# Patient Record
Sex: FEMALE | Race: WHITE | Employment: OTHER | URBAN - METROPOLITAN AREA
[De-identification: names, ages, dates, MRNs, and addresses within clinical notes are randomized per-mention and may not be internally consistent; named-entity substitution may affect disease eponyms.]

---

## 2019-12-14 ENCOUNTER — APPOINTMENT (OUTPATIENT)
Dept: GENERAL RADIOLOGY | Facility: HOSPITAL | Age: 74
DRG: 177 | End: 2019-12-14
Attending: EMERGENCY MEDICINE
Payer: MEDICARE

## 2019-12-14 ENCOUNTER — APPOINTMENT (OUTPATIENT)
Dept: ULTRASOUND IMAGING | Facility: HOSPITAL | Age: 74
DRG: 177 | End: 2019-12-14
Attending: EMERGENCY MEDICINE
Payer: MEDICARE

## 2019-12-14 ENCOUNTER — HOSPITAL ENCOUNTER (INPATIENT)
Facility: HOSPITAL | Age: 74
LOS: 37 days | Discharge: NURSING, REHAB OR LONG-TERM CARE | DRG: 177 | End: 2020-01-20
Attending: EMERGENCY MEDICINE | Admitting: HOSPITALIST
Payer: MEDICARE

## 2019-12-14 ENCOUNTER — APPOINTMENT (OUTPATIENT)
Dept: CT IMAGING | Facility: HOSPITAL | Age: 74
DRG: 177 | End: 2019-12-14
Attending: EMERGENCY MEDICINE
Payer: MEDICARE

## 2019-12-14 DIAGNOSIS — R91.8 LUNG MASS: Primary | ICD-10-CM

## 2019-12-14 DIAGNOSIS — R09.02 HYPOXIA: ICD-10-CM

## 2019-12-14 DIAGNOSIS — M79.89 SWELLING OF LOWER EXTREMITY: ICD-10-CM

## 2019-12-14 LAB
ANION GAP SERPL CALC-SCNC: 5 MMOL/L (ref 0–18)
BASOPHILS # BLD AUTO: 0.03 X10(3) UL (ref 0–0.2)
BASOPHILS NFR BLD AUTO: 0.2 %
BUN BLD-MCNC: 20 MG/DL (ref 7–18)
BUN/CREAT SERPL: 22.2 (ref 10–20)
CALCIUM BLD-MCNC: 8.7 MG/DL (ref 8.5–10.1)
CHLORIDE SERPL-SCNC: 108 MMOL/L (ref 98–112)
CO2 SERPL-SCNC: 31 MMOL/L (ref 21–32)
CREAT BLD-MCNC: 0.9 MG/DL (ref 0.55–1.02)
DEPRECATED RDW RBC AUTO: 52.1 FL (ref 35.1–46.3)
EOSINOPHIL # BLD AUTO: 0 X10(3) UL (ref 0–0.7)
EOSINOPHIL NFR BLD AUTO: 0 %
ERYTHROCYTE [DISTWIDTH] IN BLOOD BY AUTOMATED COUNT: 14.9 % (ref 11–15)
GLUCOSE BLD-MCNC: 128 MG/DL (ref 70–99)
HCT VFR BLD AUTO: 48.5 % (ref 35–48)
HGB BLD-MCNC: 15.7 G/DL (ref 12–16)
IMM GRANULOCYTES # BLD AUTO: 0.12 X10(3) UL (ref 0–1)
IMM GRANULOCYTES NFR BLD: 0.7 %
LYMPHOCYTES # BLD AUTO: 1.42 X10(3) UL (ref 1–4)
LYMPHOCYTES NFR BLD AUTO: 8.5 %
MCH RBC QN AUTO: 30.4 PG (ref 26–34)
MCHC RBC AUTO-ENTMCNC: 32.4 G/DL (ref 31–37)
MCV RBC AUTO: 93.8 FL (ref 80–100)
MONOCYTES # BLD AUTO: 0.8 X10(3) UL (ref 0.1–1)
MONOCYTES NFR BLD AUTO: 4.8 %
NEUTROPHILS # BLD AUTO: 14.39 X10 (3) UL (ref 1.5–7.7)
NEUTROPHILS # BLD AUTO: 14.39 X10(3) UL (ref 1.5–7.7)
NEUTROPHILS NFR BLD AUTO: 85.8 %
NT-PROBNP SERPL-MCNC: 973 PG/ML (ref ?–450)
OSMOLALITY SERPL CALC.SUM OF ELEC: 302 MOSM/KG (ref 275–295)
PLATELET # BLD AUTO: 274 10(3)UL (ref 150–450)
POTASSIUM SERPL-SCNC: 3.3 MMOL/L (ref 3.5–5.1)
RBC # BLD AUTO: 5.17 X10(6)UL (ref 3.8–5.3)
SODIUM SERPL-SCNC: 144 MMOL/L (ref 136–145)
WBC # BLD AUTO: 16.8 X10(3) UL (ref 4–11)

## 2019-12-14 PROCEDURE — 71045 X-RAY EXAM CHEST 1 VIEW: CPT | Performed by: EMERGENCY MEDICINE

## 2019-12-14 PROCEDURE — 71260 CT THORAX DX C+: CPT | Performed by: EMERGENCY MEDICINE

## 2019-12-14 PROCEDURE — 71046 X-RAY EXAM CHEST 2 VIEWS: CPT | Performed by: EMERGENCY MEDICINE

## 2019-12-14 PROCEDURE — 93970 EXTREMITY STUDY: CPT | Performed by: EMERGENCY MEDICINE

## 2019-12-14 RX ORDER — POTASSIUM CHLORIDE 20 MEQ/1
40 TABLET, EXTENDED RELEASE ORAL EVERY 4 HOURS
Status: DISPENSED | OUTPATIENT
Start: 2019-12-14 | End: 2019-12-15

## 2019-12-14 RX ORDER — NICOTINE 21 MG/24HR
1 PATCH, TRANSDERMAL 24 HOURS TRANSDERMAL DAILY
Status: DISCONTINUED | OUTPATIENT
Start: 2019-12-14 | End: 2020-01-20

## 2019-12-14 RX ORDER — HYDRALAZINE HYDROCHLORIDE 20 MG/ML
10 INJECTION INTRAMUSCULAR; INTRAVENOUS EVERY 6 HOURS PRN
Status: DISCONTINUED | OUTPATIENT
Start: 2019-12-14 | End: 2020-01-20

## 2019-12-14 RX ORDER — ALPRAZOLAM 0.25 MG/1
0.25 TABLET ORAL 2 TIMES DAILY PRN
Status: DISCONTINUED | OUTPATIENT
Start: 2019-12-14 | End: 2019-12-15

## 2019-12-14 RX ORDER — HYDROCODONE BITARTRATE AND ACETAMINOPHEN 5; 325 MG/1; MG/1
2 TABLET ORAL EVERY 4 HOURS PRN
Status: DISCONTINUED | OUTPATIENT
Start: 2019-12-14 | End: 2019-12-15

## 2019-12-14 RX ORDER — HEPARIN SODIUM 5000 [USP'U]/ML
5000 INJECTION, SOLUTION INTRAVENOUS; SUBCUTANEOUS EVERY 8 HOURS SCHEDULED
Status: COMPLETED | OUTPATIENT
Start: 2019-12-14 | End: 2019-12-15

## 2019-12-14 RX ORDER — ACETAMINOPHEN 325 MG/1
650 TABLET ORAL EVERY 4 HOURS PRN
Status: DISCONTINUED | OUTPATIENT
Start: 2019-12-14 | End: 2020-01-20

## 2019-12-14 RX ORDER — HYDROCODONE BITARTRATE AND ACETAMINOPHEN 5; 325 MG/1; MG/1
1 TABLET ORAL EVERY 4 HOURS PRN
Status: DISCONTINUED | OUTPATIENT
Start: 2019-12-14 | End: 2019-12-15

## 2019-12-14 RX ORDER — ONDANSETRON 2 MG/ML
4 INJECTION INTRAMUSCULAR; INTRAVENOUS EVERY 6 HOURS PRN
Status: DISCONTINUED | OUTPATIENT
Start: 2019-12-14 | End: 2020-01-20

## 2019-12-14 RX ORDER — METOCLOPRAMIDE HYDROCHLORIDE 5 MG/ML
10 INJECTION INTRAMUSCULAR; INTRAVENOUS EVERY 8 HOURS PRN
Status: DISCONTINUED | OUTPATIENT
Start: 2019-12-14 | End: 2019-12-22

## 2019-12-14 RX ORDER — SODIUM CHLORIDE 0.9 % (FLUSH) 0.9 %
3 SYRINGE (ML) INJECTION AS NEEDED
Status: DISCONTINUED | OUTPATIENT
Start: 2019-12-14 | End: 2020-01-20

## 2019-12-15 LAB
ANION GAP SERPL CALC-SCNC: 3 MMOL/L (ref 0–18)
BASOPHILS # BLD AUTO: 0.02 X10(3) UL (ref 0–0.2)
BASOPHILS NFR BLD AUTO: 0.1 %
BUN BLD-MCNC: 17 MG/DL (ref 7–18)
BUN/CREAT SERPL: 20.5 (ref 10–20)
CALCIUM BLD-MCNC: 8.5 MG/DL (ref 8.5–10.1)
CHLORIDE SERPL-SCNC: 108 MMOL/L (ref 98–112)
CO2 SERPL-SCNC: 32 MMOL/L (ref 21–32)
CREAT BLD-MCNC: 0.83 MG/DL (ref 0.55–1.02)
DEPRECATED RDW RBC AUTO: 54.1 FL (ref 35.1–46.3)
EOSINOPHIL # BLD AUTO: 0.02 X10(3) UL (ref 0–0.7)
EOSINOPHIL NFR BLD AUTO: 0.1 %
ERYTHROCYTE [DISTWIDTH] IN BLOOD BY AUTOMATED COUNT: 15 % (ref 11–15)
GLUCOSE BLD-MCNC: 117 MG/DL (ref 70–99)
HCT VFR BLD AUTO: 45.8 % (ref 35–48)
HGB BLD-MCNC: 14.4 G/DL (ref 12–16)
IMM GRANULOCYTES # BLD AUTO: 0.09 X10(3) UL (ref 0–1)
IMM GRANULOCYTES NFR BLD: 0.6 %
LYMPHOCYTES # BLD AUTO: 1.39 X10(3) UL (ref 1–4)
LYMPHOCYTES NFR BLD AUTO: 9.6 %
MCH RBC QN AUTO: 30.3 PG (ref 26–34)
MCHC RBC AUTO-ENTMCNC: 31.4 G/DL (ref 31–37)
MCV RBC AUTO: 96.2 FL (ref 80–100)
MONOCYTES # BLD AUTO: 0.83 X10(3) UL (ref 0.1–1)
MONOCYTES NFR BLD AUTO: 5.7 %
NEUTROPHILS # BLD AUTO: 12.1 X10 (3) UL (ref 1.5–7.7)
NEUTROPHILS # BLD AUTO: 12.1 X10(3) UL (ref 1.5–7.7)
NEUTROPHILS NFR BLD AUTO: 83.9 %
OSMOLALITY SERPL CALC.SUM OF ELEC: 299 MOSM/KG (ref 275–295)
PLATELET # BLD AUTO: 240 10(3)UL (ref 150–450)
POTASSIUM SERPL-SCNC: 3.3 MMOL/L (ref 3.5–5.1)
RBC # BLD AUTO: 4.76 X10(6)UL (ref 3.8–5.3)
SODIUM SERPL-SCNC: 143 MMOL/L (ref 136–145)
WBC # BLD AUTO: 14.5 X10(3) UL (ref 4–11)

## 2019-12-15 RX ORDER — IPRATROPIUM BROMIDE AND ALBUTEROL SULFATE 2.5; .5 MG/3ML; MG/3ML
3 SOLUTION RESPIRATORY (INHALATION)
Status: DISCONTINUED | OUTPATIENT
Start: 2019-12-15 | End: 2019-12-27

## 2019-12-15 RX ORDER — POTASSIUM CHLORIDE 20 MEQ/1
40 TABLET, EXTENDED RELEASE ORAL EVERY 4 HOURS
Status: COMPLETED | OUTPATIENT
Start: 2019-12-15 | End: 2019-12-15

## 2019-12-15 RX ORDER — IPRATROPIUM BROMIDE AND ALBUTEROL SULFATE 2.5; .5 MG/3ML; MG/3ML
SOLUTION RESPIRATORY (INHALATION)
Status: DISPENSED
Start: 2019-12-15 | End: 2019-12-16

## 2019-12-15 RX ORDER — METHYLPREDNISOLONE SODIUM SUCCINATE 125 MG/2ML
80 INJECTION, POWDER, LYOPHILIZED, FOR SOLUTION INTRAMUSCULAR; INTRAVENOUS EVERY 8 HOURS
Status: COMPLETED | OUTPATIENT
Start: 2019-12-15 | End: 2019-12-18

## 2019-12-15 NOTE — H&P
[facility] H[facility] PHYSICAL     CC: Patient presents with:  Swelling Edema  Dyspnea KELTON SOB       PCP: None Pcp    History of Present Illness:   Patient is a 66year old female with PMH sig- unknown (hasn't seen a doctor in years) nicotine use x y lesion  PSYCH- normal mentation, normal affect      LABS:   Lab Results   Component Value Date    WBC 14.5 12/15/2019    HGB 14.4 12/15/2019    HCT 45.8 12/15/2019    .0 12/15/2019    CREATSERUM 0.83 12/15/2019    BUN 17 12/15/2019     12/15/2 AIRWAYS: The mass detailed below abuts the anterior and right lateral aspect of the trachea and sonal crossing the midline. The mass further extends along the right main bronchus without definite luminal narrowing.  MEDIASTINUM/ALLEN: There is a lobulated Us Venous Doppler Leg Bilat - Diag Img (cpt=93970)    Result Date: 12/14/2019  PROCEDURE: US VENOUS DOPPLER LEG BILAT-DIAG IMG (CPT=93970)  COMPARISON: None. INDICATIONS: Swelling of legs and feet.   TECHNIQUE: Color duplex Doppler venous ultrasound NSR    ASSESSMENT / PLAN:   66year old female with PMH sig- unknown (hasn't seen a doctor in years) nicotine use x years here with SOB and leg swelling    Mediastinal mass with SVC compression  Elevated BP  Dyspnea  LE edema  Hypokalemia  Leukocytosis

## 2019-12-15 NOTE — ED NOTES
Orders for admission, patient is aware of plan and ready to go upstairs. Any questions, please call ED FRANCESCO Francisco December  at extension [phone]    Pt alert and oriented, ambulates without assist. 96% on 1 lpm via NC.  Pt aware of test results and agreeable to admission

## 2019-12-15 NOTE — ED PROVIDER NOTES
Patient Seen in: [facility] AND [facility] Emergency Department      History   Patient presents with:  Swelling Edema  Dyspnea KELTON SOB    Stated Complaint: swelling of legs and feet    HPI    Pt is 67 yo F who p/w b/l leg swelling x 4 days.  Started in left foot BUN/CREA Ratio 22.2 (*)     Calculated Osmolality 302 (*)     All other components within normal limits   PRO BETA NATRIURETIC PEPTIDE - Abnormal; Notable for the following components:    Pro-Beta Natriuretic Peptide 973 (*)     All other components wit this mass and could represent. 2. Ascending thoracic aorta is borderline aneurysmal measuring 4.9 cm.     Dictated by (CST): Desi Rizvi MD on 12/14/2019 at 20:54     Approved by (CST): Desi Rizvi MD on 12/14/2019 at 21:03          Us Venous three months to obtain basic health screening including reassessment of your blood pressure. Medications Prescribed:  There are no discharge medications for this patient.                  Present on Admission           ICD-10-CM Noted POA    Lung mass R9

## 2019-12-15 NOTE — PLAN OF CARE
Patient with dyspnea on exertion, remains on O2 2L. Patient reports being anxious because of diagnosis and because of not being able to smoke. Patient also reports that this has been a \"bad year\" for her family with many deaths.   Emotional support pro to facilitate oxygenation and minimize respiratory effort  - Oxygen supplementation based on oxygen saturation or ABGs  - Provide Smoking Cessation handout, if applicable  - Encourage broncho-pulmonary hygiene including cough, deep breathe, Incentive [facility] Debora

## 2019-12-15 NOTE — CONSULTS
[facility]  Report of Consultation    Jamil Simon Patient Status:  Inpatient    9/15/1945 MRN [id number]   Location [facility] 4W/SW/SE Attending Dora Chaparro MD   Hosp Day # 1 PCP None Pcp     REASON FOR CONSULTATION:     Mediastinal Intramuscular, Prior to discharge  •  nicotine (NICODERM CQ) 21 MG/24HR 1 patch, 1 patch, Transdermal, Daily  •  hydrALAzine HCl (APRESOLINE) injection 10 mg, 10 mg, Intravenous, Q6H PRN    Review of Systems:  A 10-point review of systems was done with per (H) 7 - 18 mg/dL    Creatinine 0.90 0.55 - 1.02 mg/dL    BUN/CREA Ratio 22.2 (H) 10.0 - 20.0    Calcium, Total 8.7 8.5 - 10.1 mg/dL    Calculated Osmolality 302 (H) 275 - 295 mOsm/kg    GFR, Non- 62 >=60    GFR, -American 72 >=60   C 4.0 - 11.0 x10(3) uL    RBC 4.76 3.80 - 5.30 x10(6)uL    HGB 14.4 12.0 - 16.0 g/dL    HCT 45.8 35.0 - 48.0 %    MCV 96.2 80.0 - 100.0 fL    MCH 30.3 26.0 - 34.0 pg    MCHC 31.4 31.0 - 37.0 g/dL    RDW-SD 54.1 (H) 35.1 - 46.3 fL    RDW 15.0 11.0 - 15.0 % (vvx=[id number])    Result Date: 12/14/2019  CONCLUSION:   No DVT in either lower extremity.    Dictated by (CST): Beryle Queen, MD on 12/14/2019 at 20:40     Approved by (CST): Beryle Queen, MD on 12/14/2019 at 20:40          Xr Chest Ap Portable  (cp

## 2019-12-15 NOTE — CONSULTS
Pulmonary Consult       NAME: Sujata Hart - ROOM: 240/283-J - MRN: [id number] - Age: 66year old - :  9/15/1945    Date of Admission: 2019  5:18 PM  Admission Diagnosis: Lung mass [R91.8]  Hypoxia [R09.02]  Swelling of lower extremity [M79.89] on file        Relationship status: Not on file      Intimate partner violence:        Fear of current or ex partner: Not on file        Emotionally abused: Not on file        Physically abused: Not on file        Forced sexual activity: Not on file    Oth Neck: Supple, symmetrical, trachea midline, no adenopathy,  no carotid bruit and no JVD. Back:   Non tender   Lungs:   Wheezing  bilaterally. Chest wall:  No tenderness or deformity.    Heart:  Regular rate and rhythm, S1, S2 normal, no murmur,   Abdo Absolute Prelim 14.39 (H) 1.50 - 7.70 x10 (3) uL    Neutrophil Absolute 14.39 (H) 1.50 - 7.70 x10(3) uL    Lymphocyte Absolute 1.42 1.00 - 4.00 x10(3) uL    Monocyte Absolute 0.80 0.10 - 1.00 x10(3) uL    Eosinophil Absolute 0.00 0.00 - 0.70 x10(3) uL    B uL    Neutrophil % 83.9 %    Lymphocyte % 9.6 %    Monocyte % 5.7 %    Eosinophil % 0.1 %    Basophil % 0.1 %    Immature Granulocyte % 0.6 %       Imaging: CT chest CONCLUSION:   1.  Large mass in the right suprahilar region with contralateral mediastinal

## 2019-12-15 NOTE — PLAN OF CARE
Problem: Patient Centered Care  Goal: Patient preferences are identified and integrated in the patient's plan of care  Description  Interventions:  - What would you like us to know as we care for you? I haven't been to a doctor in a long time.   I like to support as indicated  - Manage/alleviate anxiety  - Monitor for signs/symptoms of CO2 retention  Outcome: Progressing     Problem: COPING  Goal: Pt/Family able to verbalize concerns and demonstrate effective coping strategies  Description  INTERVENTIONS:

## 2019-12-16 ENCOUNTER — APPOINTMENT (OUTPATIENT)
Dept: CT IMAGING | Facility: HOSPITAL | Age: 74
DRG: 177 | End: 2019-12-16
Attending: INTERNAL MEDICINE
Payer: MEDICARE

## 2019-12-16 LAB
ALBUMIN SERPL-MCNC: 3.5 G/DL (ref 3.4–5)
ALP LIVER SERPL-CCNC: 65 U/L (ref 55–142)
ALT SERPL-CCNC: 36 U/L (ref 13–56)
ANION GAP SERPL CALC-SCNC: 3 MMOL/L (ref 0–18)
AST SERPL-CCNC: 20 U/L (ref 15–37)
BILIRUB DIRECT SERPL-MCNC: 0.2 MG/DL (ref 0–0.2)
BILIRUB SERPL-MCNC: 0.7 MG/DL (ref 0.1–2)
BUN BLD-MCNC: 19 MG/DL (ref 7–18)
BUN/CREAT SERPL: 21.1 (ref 10–20)
CALCIUM BLD-MCNC: 8.5 MG/DL (ref 8.5–10.1)
CHLORIDE SERPL-SCNC: 108 MMOL/L (ref 98–112)
CO2 SERPL-SCNC: 33 MMOL/L (ref 21–32)
CREAT BLD-MCNC: 0.9 MG/DL (ref 0.55–1.02)
DEPRECATED RDW RBC AUTO: 54.6 FL (ref 35.1–46.3)
ERYTHROCYTE [DISTWIDTH] IN BLOOD BY AUTOMATED COUNT: 15.4 % (ref 11–15)
GLUCOSE BLD-MCNC: 139 MG/DL (ref 70–99)
HCT VFR BLD AUTO: 44.5 % (ref 35–48)
HGB BLD-MCNC: 13.8 G/DL (ref 12–16)
INR BLD: 0.94 (ref 0.9–1.2)
LDH SERPL L TO P-CCNC: 307 U/L (ref 84–246)
M PROTEIN MFR SERPL ELPH: 5.8 G/DL (ref 6.4–8.2)
MCH RBC QN AUTO: 30 PG (ref 26–34)
MCHC RBC AUTO-ENTMCNC: 31 G/DL (ref 31–37)
MCV RBC AUTO: 96.7 FL (ref 80–100)
OSMOLALITY SERPL CALC.SUM OF ELEC: 303 MOSM/KG (ref 275–295)
PLATELET # BLD AUTO: 227 10(3)UL (ref 150–450)
POTASSIUM SERPL-SCNC: 4.8 MMOL/L (ref 3.5–5.1)
PROTHROMBIN TIME: 12.4 SECONDS (ref 11.8–14.5)
RBC # BLD AUTO: 4.6 X10(6)UL (ref 3.8–5.3)
SODIUM SERPL-SCNC: 144 MMOL/L (ref 136–145)
WBC # BLD AUTO: 12.2 X10(3) UL (ref 4–11)

## 2019-12-16 PROCEDURE — 74177 CT ABD & PELVIS W/CONTRAST: CPT | Performed by: INTERNAL MEDICINE

## 2019-12-16 RX ORDER — AMLODIPINE BESYLATE 2.5 MG/1
2.5 TABLET ORAL DAILY
Status: DISCONTINUED | OUTPATIENT
Start: 2019-12-16 | End: 2019-12-23

## 2019-12-16 NOTE — PLAN OF CARE
Patient's breathing slightly easier and swelling to lower extremities is less. NPO after midnight for possible CT guided biopsy versus EBUS.       Problem: Patient Centered Care  Goal: Patient preferences are identified and integrated in the patient's plan Spirometry  - Assess the need for suctioning and perform as needed  - Assess and instruct to report SOB or any respiratory difficulty  - Respiratory Therapy support as indicated  - Manage/alleviate anxiety  - Monitor for signs/symptoms of CO2 retention  Trish Sessions

## 2019-12-16 NOTE — PROGRESS NOTES
Subjective      No acute events overnight, feels okay, anxious to get diagnosis     PERTINENT HISTORY:     History:  History reviewed. No pertinent past medical history.   Past Surgical History:   Procedure Laterality Date   • APPENDECTOMY       Family Hi 73.9 kg (163 lb), SpO2 96 %. Performance Status: 2   General: Patient is alert and oriented x 3, not in acute distress.    Vital Signs: BP (!) 163/60 (BP Location: Left arm)   Pulse 89   Temp 97.6 °F (36.4 °C) (Oral)   Resp 18   Ht 1.575 m (5' 2\")   Wt 45.8 44.5   MCV 93.8 96.2 96.7   MCH 30.4 30.3 30.0   MCHC 32.4 31.4 31.0   RDW 14.9 15.0 15.4*   NEPRELIM 14.39* 12.10*  --    WBC 16.8* 14.5* 12.2*   .0 240.0 227.0             Imaging:    Ct Chest(contrast Only) (cpt=71260)    Result Date: 12/14/ highly concerning for a primary bronchogenic carcinoma and probably confluent yamilex tumor involvement. The mass invades the superior vena cava with resulting luminal   narrowing.   The mass also results in severe narrowing of the right upper lobar pulmonar

## 2019-12-16 NOTE — PROGRESS NOTES
Pulmonary Progress Note     Assessment / Plan:  1. Acute hypoxemic resp failure  - due to lung mass and possible AECOPD  - steroids as below  - wean O2 as tolerated   2. COPD exacerbation  - breo  - steroids  - BD protocol  3.  Lung mass   - likely primary

## 2019-12-16 NOTE — PROGRESS NOTES
[facility] Hospital[facility] Progress Note     PCP: None Pcp    Chief Complaint: follow-up    SUBJECTIVE:  Denies any new sx- ongoing SOB    OBJECTIVE:  Temp:  [97.6 °F (36.4 °C)-98.2 °F (36.8 °C)] 98.2 °F (36.8 °C)  Pulse:  [80-91] 80  Resp:  [16-20] 20  BP: (159-17 virus vaccine PF, hydrALAzine HCl      ASSESSMENT AND PLAN    66year old female with PMH sig- unknown (hasn't seen a doctor in years) nicotine use x years here with SOB and leg swelling     Mediastinal mass with SVC compression  Elevated BP  Dyspnea  LE e

## 2019-12-17 ENCOUNTER — ANESTHESIA (OUTPATIENT)
Dept: ENDOSCOPY | Facility: HOSPITAL | Age: 74
DRG: 177 | End: 2019-12-17
Payer: MEDICARE

## 2019-12-17 ENCOUNTER — APPOINTMENT (OUTPATIENT)
Dept: MRI IMAGING | Facility: HOSPITAL | Age: 74
DRG: 177 | End: 2019-12-17
Attending: INTERNAL MEDICINE
Payer: MEDICARE

## 2019-12-17 ENCOUNTER — ANESTHESIA EVENT (OUTPATIENT)
Dept: ENDOSCOPY | Facility: HOSPITAL | Age: 74
DRG: 177 | End: 2019-12-17
Payer: MEDICARE

## 2019-12-17 LAB
ANION GAP SERPL CALC-SCNC: 4 MMOL/L (ref 0–18)
BUN BLD-MCNC: 22 MG/DL (ref 7–18)
BUN/CREAT SERPL: 24.7 (ref 10–20)
CALCIUM BLD-MCNC: 8.4 MG/DL (ref 8.5–10.1)
CHLORIDE SERPL-SCNC: 107 MMOL/L (ref 98–112)
CO2 SERPL-SCNC: 32 MMOL/L (ref 21–32)
CREAT BLD-MCNC: 0.89 MG/DL (ref 0.55–1.02)
DEPRECATED RDW RBC AUTO: 55.5 FL (ref 35.1–46.3)
ERYTHROCYTE [DISTWIDTH] IN BLOOD BY AUTOMATED COUNT: 15.4 % (ref 11–15)
GLUCOSE BLD-MCNC: 141 MG/DL (ref 70–99)
HCT VFR BLD AUTO: 43.1 % (ref 35–48)
HGB BLD-MCNC: 13.4 G/DL (ref 12–16)
MCH RBC QN AUTO: 30.2 PG (ref 26–34)
MCHC RBC AUTO-ENTMCNC: 31.1 G/DL (ref 31–37)
MCV RBC AUTO: 97.1 FL (ref 80–100)
OSMOLALITY SERPL CALC.SUM OF ELEC: 302 MOSM/KG (ref 275–295)
PLATELET # BLD AUTO: 213 10(3)UL (ref 150–450)
POTASSIUM SERPL-SCNC: 4.3 MMOL/L (ref 3.5–5.1)
RBC # BLD AUTO: 4.44 X10(6)UL (ref 3.8–5.3)
SODIUM SERPL-SCNC: 143 MMOL/L (ref 136–145)
WBC # BLD AUTO: 17.4 X10(3) UL (ref 4–11)

## 2019-12-17 PROCEDURE — 70553 MRI BRAIN STEM W/O & W/DYE: CPT | Performed by: INTERNAL MEDICINE

## 2019-12-17 PROCEDURE — 07D78ZX EXTRACTION OF THORAX LYMPHATIC, VIA NATURAL OR ARTIFICIAL OPENING ENDOSCOPIC, DIAGNOSTIC: ICD-10-PCS | Performed by: INTERNAL MEDICINE

## 2019-12-17 PROCEDURE — 0BH17EZ INSERTION OF ENDOTRACHEAL AIRWAY INTO TRACHEA, VIA NATURAL OR ARTIFICIAL OPENING: ICD-10-PCS | Performed by: INTERNAL MEDICINE

## 2019-12-17 PROCEDURE — 0BJ08ZZ INSPECTION OF TRACHEOBRONCHIAL TREE, VIA NATURAL OR ARTIFICIAL OPENING ENDOSCOPIC: ICD-10-PCS | Performed by: INTERNAL MEDICINE

## 2019-12-17 RX ORDER — HYDROCODONE BITARTRATE AND ACETAMINOPHEN 5; 325 MG/1; MG/1
1 TABLET ORAL AS NEEDED
Status: DISCONTINUED | OUTPATIENT
Start: 2019-12-17 | End: 2019-12-17 | Stop reason: HOSPADM

## 2019-12-17 RX ORDER — HYDROCODONE BITARTRATE AND ACETAMINOPHEN 5; 325 MG/1; MG/1
2 TABLET ORAL AS NEEDED
Status: DISCONTINUED | OUTPATIENT
Start: 2019-12-17 | End: 2019-12-17 | Stop reason: HOSPADM

## 2019-12-17 RX ORDER — HYDROMORPHONE HYDROCHLORIDE 1 MG/ML
0.2 INJECTION, SOLUTION INTRAMUSCULAR; INTRAVENOUS; SUBCUTANEOUS EVERY 5 MIN PRN
Status: DISCONTINUED | OUTPATIENT
Start: 2019-12-17 | End: 2019-12-17 | Stop reason: HOSPADM

## 2019-12-17 RX ORDER — NALOXONE HYDROCHLORIDE 0.4 MG/ML
80 INJECTION, SOLUTION INTRAMUSCULAR; INTRAVENOUS; SUBCUTANEOUS AS NEEDED
Status: DISCONTINUED | OUTPATIENT
Start: 2019-12-17 | End: 2019-12-17 | Stop reason: HOSPADM

## 2019-12-17 RX ORDER — SODIUM CHLORIDE, SODIUM LACTATE, POTASSIUM CHLORIDE, CALCIUM CHLORIDE 600; 310; 30; 20 MG/100ML; MG/100ML; MG/100ML; MG/100ML
INJECTION, SOLUTION INTRAVENOUS CONTINUOUS
Status: DISCONTINUED | OUTPATIENT
Start: 2019-12-17 | End: 2019-12-17 | Stop reason: HOSPADM

## 2019-12-17 RX ORDER — PROCHLORPERAZINE EDISYLATE 5 MG/ML
5 INJECTION INTRAMUSCULAR; INTRAVENOUS ONCE AS NEEDED
Status: DISCONTINUED | OUTPATIENT
Start: 2019-12-17 | End: 2019-12-17 | Stop reason: HOSPADM

## 2019-12-17 RX ORDER — LIDOCAINE HYDROCHLORIDE 10 MG/ML
INJECTION, SOLUTION EPIDURAL; INFILTRATION; INTRACAUDAL; PERINEURAL AS NEEDED
Status: DISCONTINUED | OUTPATIENT
Start: 2019-12-17 | End: 2019-12-17 | Stop reason: SURG

## 2019-12-17 RX ORDER — HALOPERIDOL 5 MG/ML
0.25 INJECTION INTRAMUSCULAR ONCE AS NEEDED
Status: DISCONTINUED | OUTPATIENT
Start: 2019-12-17 | End: 2019-12-17 | Stop reason: HOSPADM

## 2019-12-17 RX ORDER — ALBUTEROL SULFATE 2.5 MG/3ML
2.5 SOLUTION RESPIRATORY (INHALATION)
Status: DISCONTINUED | OUTPATIENT
Start: 2019-12-17 | End: 2019-12-17 | Stop reason: HOSPADM

## 2019-12-17 RX ORDER — HYDROMORPHONE HYDROCHLORIDE 1 MG/ML
0.4 INJECTION, SOLUTION INTRAMUSCULAR; INTRAVENOUS; SUBCUTANEOUS EVERY 5 MIN PRN
Status: DISCONTINUED | OUTPATIENT
Start: 2019-12-17 | End: 2019-12-17 | Stop reason: HOSPADM

## 2019-12-17 RX ORDER — ONDANSETRON 2 MG/ML
4 INJECTION INTRAMUSCULAR; INTRAVENOUS ONCE AS NEEDED
Status: DISCONTINUED | OUTPATIENT
Start: 2019-12-17 | End: 2019-12-17 | Stop reason: HOSPADM

## 2019-12-17 RX ORDER — SODIUM CHLORIDE, SODIUM LACTATE, POTASSIUM CHLORIDE, CALCIUM CHLORIDE 600; 310; 30; 20 MG/100ML; MG/100ML; MG/100ML; MG/100ML
INJECTION, SOLUTION INTRAVENOUS CONTINUOUS PRN
Status: DISCONTINUED | OUTPATIENT
Start: 2019-12-17 | End: 2019-12-17 | Stop reason: SURG

## 2019-12-17 RX ORDER — MIDAZOLAM HYDROCHLORIDE 1 MG/ML
INJECTION INTRAMUSCULAR; INTRAVENOUS AS NEEDED
Status: DISCONTINUED | OUTPATIENT
Start: 2019-12-17 | End: 2019-12-17 | Stop reason: SURG

## 2019-12-17 RX ORDER — HYDROMORPHONE HYDROCHLORIDE 1 MG/ML
0.6 INJECTION, SOLUTION INTRAMUSCULAR; INTRAVENOUS; SUBCUTANEOUS EVERY 5 MIN PRN
Status: DISCONTINUED | OUTPATIENT
Start: 2019-12-17 | End: 2019-12-17 | Stop reason: HOSPADM

## 2019-12-17 RX ORDER — MORPHINE SULFATE 10 MG/ML
6 INJECTION, SOLUTION INTRAMUSCULAR; INTRAVENOUS EVERY 10 MIN PRN
Status: DISCONTINUED | OUTPATIENT
Start: 2019-12-17 | End: 2019-12-17 | Stop reason: HOSPADM

## 2019-12-17 RX ORDER — MORPHINE SULFATE 4 MG/ML
2 INJECTION, SOLUTION INTRAMUSCULAR; INTRAVENOUS EVERY 10 MIN PRN
Status: DISCONTINUED | OUTPATIENT
Start: 2019-12-17 | End: 2019-12-17 | Stop reason: HOSPADM

## 2019-12-17 RX ORDER — DEXAMETHASONE SODIUM PHOSPHATE 4 MG/ML
VIAL (ML) INJECTION AS NEEDED
Status: DISCONTINUED | OUTPATIENT
Start: 2019-12-17 | End: 2019-12-17 | Stop reason: SURG

## 2019-12-17 RX ORDER — IPRATROPIUM BROMIDE AND ALBUTEROL SULFATE 2.5; .5 MG/3ML; MG/3ML
3 SOLUTION RESPIRATORY (INHALATION) EVERY 6 HOURS PRN
Status: DISCONTINUED | OUTPATIENT
Start: 2019-12-17 | End: 2019-12-17 | Stop reason: HOSPADM

## 2019-12-17 RX ORDER — MORPHINE SULFATE 4 MG/ML
4 INJECTION, SOLUTION INTRAMUSCULAR; INTRAVENOUS EVERY 10 MIN PRN
Status: DISCONTINUED | OUTPATIENT
Start: 2019-12-17 | End: 2019-12-17 | Stop reason: HOSPADM

## 2019-12-17 RX ORDER — ONDANSETRON 2 MG/ML
INJECTION INTRAMUSCULAR; INTRAVENOUS AS NEEDED
Status: DISCONTINUED | OUTPATIENT
Start: 2019-12-17 | End: 2019-12-17 | Stop reason: SURG

## 2019-12-17 RX ADMIN — MIDAZOLAM HYDROCHLORIDE 2 MG: 1 INJECTION INTRAMUSCULAR; INTRAVENOUS at 15:03:00

## 2019-12-17 RX ADMIN — ONDANSETRON 4 MG: 2 INJECTION INTRAMUSCULAR; INTRAVENOUS at 15:39:00

## 2019-12-17 RX ADMIN — LIDOCAINE HYDROCHLORIDE 50 MG: 10 INJECTION, SOLUTION EPIDURAL; INFILTRATION; INTRACAUDAL; PERINEURAL at 15:03:00

## 2019-12-17 RX ADMIN — SODIUM CHLORIDE, SODIUM LACTATE, POTASSIUM CHLORIDE, CALCIUM CHLORIDE: 600; 310; 30; 20 INJECTION, SOLUTION INTRAVENOUS at 15:31:00

## 2019-12-17 RX ADMIN — SODIUM CHLORIDE, SODIUM LACTATE, POTASSIUM CHLORIDE, CALCIUM CHLORIDE: 600; 310; 30; 20 INJECTION, SOLUTION INTRAVENOUS at 15:30:00

## 2019-12-17 RX ADMIN — SODIUM CHLORIDE, SODIUM LACTATE, POTASSIUM CHLORIDE, CALCIUM CHLORIDE: 600; 310; 30; 20 INJECTION, SOLUTION INTRAVENOUS at 15:00:00

## 2019-12-17 RX ADMIN — SODIUM CHLORIDE, SODIUM LACTATE, POTASSIUM CHLORIDE, CALCIUM CHLORIDE: 600; 310; 30; 20 INJECTION, SOLUTION INTRAVENOUS at 15:56:00

## 2019-12-17 RX ADMIN — DEXAMETHASONE SODIUM PHOSPHATE 4 MG: 4 MG/ML VIAL (ML) INJECTION at 15:33:00

## 2019-12-17 NOTE — ANESTHESIA POSTPROCEDURE EVALUATION
Patient: Jamil Simon    Procedure Summary     Date:  12/17/19 Room / Location:  [facility] ENDOSCOPY 05 / [facility] ENDOSCOPY    Anesthesia Start:  1500 Anesthesia Stop:  9128    Procedures:       BRONCHOSCOPY (N/A )      ENDOBRONCHIAL ULTRASOUND (EBUS) (N/A )    B

## 2019-12-17 NOTE — PLAN OF CARE
Problem: Patient Centered Care  Goal: Patient preferences are identified and integrated in the patient's plan of care  Description  Interventions:  - What would you like us to know as we care for you? I haven't been to a doctor in a long time.   I like to support as indicated  - Manage/alleviate anxiety  - Monitor for signs/symptoms of CO2 retention  Outcome: Progressing     Problem: COPING  Goal: Pt/Family able to verbalize concerns and demonstrate effective coping strategies  Description  INTERVENTIONS: call light within reach, will continue to monitor.

## 2019-12-17 NOTE — ANESTHESIA PREPROCEDURE EVALUATION
Anesthesia PreOp Note    HPI:     Danica Cramer is a 66year old female who presents for preoperative consultation requested by: Yoandy Duque MD    Date of Surgery: 12/14/2019 - 12/17/2019    Procedure(s):  BRONCHOSCOPY  ENDOBRONCHIAL ULTRASOUND ( influenza vaccine (PF)(FLUZONE HD) high dose for 65 yrs & older inj 0.5ml, 0.5 mL, Intramuscular, Prior to discharge, Lus Bloch, DO  nicotine (NICODERM CQ) 21 MG/24HR 1 patch, 1 patch, Transdermal, Daily, Anamaria Lopez DO, 1 patch at 12/17/19 08 Forced sexual activity: Not on file    Other Topics      Concerns:        Not on file    Social History Narrative      Not on file      Available pre-op labs reviewed.   Lab Results   Component Value Date    WBC 17.4 (H) 12/17/2019    RBC 4.44 12/17/2 Endo/Other - negative ROS   Abdominal                Anesthesia Plan:   ASA:  3  Plan:   General  Airway:  ETT and Video laryngoscope  Post-op Pain Management: IV analgesics  Plan Comments: Was able to lie flat with nasal cannula, SpO2 remained at 95% on

## 2019-12-17 NOTE — PROGRESS NOTES
Subjective      S/p ct abd/pelvis and MRi of brain yesterday   For bronch today     PERTINENT HISTORY:     History:  History reviewed. No pertinent past medical history.   Past Surgical History:   Procedure Laterality Date   • APPENDECTOMY       Family Hi (163 lb), SpO2 96 %. Performance Status: 2   General: Patient is alert and oriented x 3, not in acute distress.    Vital Signs: /63 (BP Location: Right arm)   Pulse 77   Temp 98 °F (36.7 °C) (Oral)   Resp 20   Ht 1.575 m (5' 2\")   Wt 73.9 kg (163 NEPRELIM 14.39* 12.10*  --   --    WBC 16.8* 14.5* 12.2* 17.4*   .0 240.0 227.0 213.0             Imaging:    Ct Chest(contrast Only) (cpt=71260)    Result Date: 12/14/2019  CONCLUSION:  1.  Large mass in the right suprahilar region with contralate Uncomplicated colonic diverticulosis. 5. Status post appendectomy. 6. Lesser incidental findings as above.     Dictated by (CST): Jessie Tovar MD on 12/16/2019 at 15:43     Approved by (CST): Jessie Tovar MD on 12/16/2019 at 15:51          Xr Chest pathology results    COPD exacerbation   - started on steroids by pulm for COPD, will also help with SVC syndrome     Plan  - MRI pending   - await bronchoscopy results     [facility] Hematology/Oncology

## 2019-12-17 NOTE — ANESTHESIA PROCEDURE NOTES
Airway  Date/Time: 12/17/2019 3:06 PM  Urgency: elective    Airway not difficult    General Information and Staff    Patient location during procedure: endo  Anesthesiologist: Cristiana Sandoval MD  Performed: anesthesiologist     Indications and Patient Co

## 2019-12-17 NOTE — PLAN OF CARE
Pt went for CT abd/pelvis. MRI ordered but not complete today. Tolerating general diet. Plan for NPO at midnight for bronch tomorrow. Pt aware and agreeable. C/o headache, managed with tylenol. Ambulating standby assist. Continued on 1.5L oxygen.  Family at Cessation handout, if applicable  - Encourage broncho-pulmonary hygiene including cough, deep breathe, Incentive Spirometry  - Assess the need for suctioning and perform as needed  - Assess and instruct to report SOB or any respiratory difficulty  - Respir Monitor electrolytes and administer replacement therapy as ordered  Outcome: Progressing

## 2019-12-17 NOTE — PROGRESS NOTES
[facility] Hospitalist Progress Note     PCP: None Pcp    Chief Complaint: follow-up    SUBJECTIVE:  Denies any new sx- ongoing SOB    OBJECTIVE:  Temp:  [98 °F (36.7 °C)-98.2 °F (36.8 °C)] 98 °F (36.7 °C)  Pulse:  [] 77  Resp:  [20] 20  BP: (147-163)/(60 [DISCONTINUED] HYDROcodone-acetaminophen, ondansetron HCl, Metoclopramide HCl, influenza virus vaccine PF, hydrALAzine HCl      ASSESSMENT AND PLAN    Wale Gaitan 66year old female with PMH sig- unknown (hasn't seen a doctor in years) nicotine use x y

## 2019-12-17 NOTE — OPERATIVE REPORT
Bronchoscopy procedure report    Preop diagnosis: Abnormal CT Chest  Postop diagnosis: Abnormal CT Chest  Procedure performed: Bronchoscopy with endobronchial ultrasound guided transbronchial needle aspiration    Procedure:  Patient was identified in the p the recovery area. Estimated Blood Loss: <5ml    Rapid on-site evaluation: adequate sampling, preliminary consistent with small cell lung cancer    Nette Cedillo M.D.   Pulmonary and Critical Care Medicine  [facility]

## 2019-12-18 ENCOUNTER — APPOINTMENT (OUTPATIENT)
Dept: CV DIAGNOSTICS | Facility: HOSPITAL | Age: 74
DRG: 177 | End: 2019-12-18
Attending: INTERNAL MEDICINE
Payer: MEDICARE

## 2019-12-18 ENCOUNTER — APPOINTMENT (OUTPATIENT)
Dept: PICC SERVICES | Facility: HOSPITAL | Age: 74
DRG: 177 | End: 2019-12-18
Attending: INTERNAL MEDICINE
Payer: MEDICARE

## 2019-12-18 PROBLEM — C34.90 SMALL CELL LUNG CANCER (HCC): Status: ACTIVE | Noted: 2019-12-18

## 2019-12-18 LAB
ANION GAP SERPL CALC-SCNC: 4 MMOL/L (ref 0–18)
BUN BLD-MCNC: 32 MG/DL (ref 7–18)
BUN/CREAT SERPL: 38.1 (ref 10–20)
CALCIUM BLD-MCNC: 8.3 MG/DL (ref 8.5–10.1)
CHLORIDE SERPL-SCNC: 106 MMOL/L (ref 98–112)
CO2 SERPL-SCNC: 34 MMOL/L (ref 21–32)
CREAT BLD-MCNC: 0.84 MG/DL (ref 0.55–1.02)
GLUCOSE BLD-MCNC: 133 MG/DL (ref 70–99)
OSMOLALITY SERPL CALC.SUM OF ELEC: 307 MOSM/KG (ref 275–295)
POTASSIUM SERPL-SCNC: 4.6 MMOL/L (ref 3.5–5.1)
SODIUM SERPL-SCNC: 144 MMOL/L (ref 136–145)

## 2019-12-18 PROCEDURE — 93306 TTE W/DOPPLER COMPLETE: CPT | Performed by: INTERNAL MEDICINE

## 2019-12-18 PROCEDURE — 05HY33Z INSERTION OF INFUSION DEVICE INTO UPPER VEIN, PERCUTANEOUS APPROACH: ICD-10-PCS | Performed by: INTERNAL MEDICINE

## 2019-12-18 RX ORDER — ENOXAPARIN SODIUM 100 MG/ML
40 INJECTION SUBCUTANEOUS DAILY
Status: DISCONTINUED | OUTPATIENT
Start: 2019-12-18 | End: 2019-12-24

## 2019-12-18 RX ORDER — PREDNISONE 20 MG/1
40 TABLET ORAL
Status: DISCONTINUED | OUTPATIENT
Start: 2019-12-19 | End: 2019-12-20

## 2019-12-18 RX ORDER — LIDOCAINE HYDROCHLORIDE 10 MG/ML
0.5 INJECTION, SOLUTION INFILTRATION; PERINEURAL ONCE AS NEEDED
Status: ACTIVE | OUTPATIENT
Start: 2019-12-18 | End: 2019-12-18

## 2019-12-18 RX ORDER — FUROSEMIDE 10 MG/ML
20 INJECTION INTRAMUSCULAR; INTRAVENOUS ONCE
Status: COMPLETED | OUTPATIENT
Start: 2019-12-18 | End: 2019-12-18

## 2019-12-18 RX ORDER — ALLOPURINOL 300 MG/1
300 TABLET ORAL 2 TIMES DAILY
Status: DISCONTINUED | OUTPATIENT
Start: 2019-12-18 | End: 2020-01-20

## 2019-12-18 RX ORDER — SODIUM CHLORIDE 0.9 % (FLUSH) 0.9 %
10 SYRINGE (ML) INJECTION AS NEEDED
Status: DISCONTINUED | OUTPATIENT
Start: 2019-12-18 | End: 2020-01-14

## 2019-12-18 NOTE — PROGRESS NOTES
[facility] Hospitalist Progress Note     PCP: None Pcp    Chief Complaint: follow-up    SUBJECTIVE:  - feels about the same, still having wheezing, still has LE edema, but improved from admission, has 3 years of stable orthopnea, has knowles which is stable    OBJE Besylate  2.5 mg Oral Daily   • Fluticasone Furoate-Vilanterol  1 puff Inhalation Daily   • MethylPREDNISolone Sodium Succ  80 mg Intravenous Q8H   • ipratropium-albuterol  3 mL Nebulization Q6H WA   • nicotine  1 patch Transdermal Daily       Normal Salin

## 2019-12-18 NOTE — PROGRESS NOTES
Vascular Access Note  Powerglide midline catheter insertion    Vascular Access Screening:   Allergies to Lidocaine: no  Allergies to Latex: no  Presence of Pacemaker/Defibrillator: No  Mastectomy with Lymph Node Dissection: No  AV Fistula / AV Graft: No  Sarah Bolnarendra

## 2019-12-18 NOTE — PLAN OF CARE
Problem: Patient Centered Care  Goal: Patient preferences are identified and integrated in the patient's plan of care  Description  Interventions:  - What would you like us to know as we care for you? I haven't been to a doctor in a long time.   I like to support as indicated  - Manage/alleviate anxiety  - Monitor for signs/symptoms of CO2 retention  Outcome: Progressing     Problem: COPING  Goal: Pt/Family able to verbalize concerns and demonstrate effective coping strategies  Description  INTERVENTIONS: pitting edema noted. Legs elevated on pillows. Safety measures in place, call light within reach, will continue to monitor.

## 2019-12-18 NOTE — PROGRESS NOTES
Pulmonary Progress Note     Assessment / Plan:  1. Acute hypoxemic resp failure  - due to lung mass and possible AECOPD  - steroids as below  - wean O2 as tolerated   2. COPD exacerbation  - IV to po steroids tomorrow  - breo  - BD protocol  3.  Lung mass

## 2019-12-18 NOTE — PROGRESS NOTES
Subjective      S/p bronch yesterday prelim read is small cell, await final pathology   she feels well. Slight cough     PERTINENT HISTORY:     History:  History reviewed. No pertinent past medical history.   Past Surgical History:   Procedure Laterality inj 0.5ml, 0.5 mL, Intramuscular, Prior to discharge  •  nicotine (NICODERM CQ) 21 MG/24HR 1 patch, 1 patch, Transdermal, Daily  •  hydrALAzine HCl (APRESOLINE) injection 10 mg, 10 mg, Intravenous, Q6H PRN    Review of Systems:  A 10-point review of system 96.7 97.1   MCH 30.4 30.3 30.0 30.2   MCHC 32.4 31.4 31.0 31.1   RDW 14.9 15.0 15.4* 15.4*   NEPRELIM 14.39* 12.10*  --   --    WBC 16.8* 14.5* 12.2* 17.4*   .0 240.0 227.0 213.0             Imaging:    Ct Chest(contrast Only) (cpt=71260)    Result abdominal sonography for further assessment. 3. Infrarenal abdominal aortic ectasia. 4. Uncomplicated colonic diverticulosis. 5. Status post appendectomy. 6. Lesser incidental findings as above.     Dictated by (CST): Jennifer Mahmood MD on 12/16/2019 a - s/p bronchoscopy, await final read  - if consistent with small cell, will give chemotherapy as an inpatient     SVC syndrome  - mildly symptomatic   - await pathology results    COPD exacerbation   - started on steroids by pulm for COPD, will also help

## 2019-12-18 NOTE — PLAN OF CARE
Pt alert and oriented x4. VSS. On 1.5L of O2. On remote tele. Voiding appropriately in toilet. Up with standby assist. No complaints of nausea or pain. IV solumedrol given. Had brain MRI and bronchoscopy today. Plan for 2Decho tomorrow.  Fall precautions in saturation or ABGs  - Provide Smoking Cessation handout, if applicable  - Encourage broncho-pulmonary hygiene including cough, deep breathe, Incentive Spirometry  - Assess the need for suctioning and perform as needed  - Assess and instruct to report SOB o for life threatening arrhythmias  - Monitor electrolytes and administer replacement therapy as ordered  Outcome: Progressing

## 2019-12-19 LAB
ALBUMIN SERPL-MCNC: 2.9 G/DL (ref 3.4–5)
ALBUMIN/GLOB SERPL: 1.3 {RATIO} (ref 1–2)
ALP LIVER SERPL-CCNC: 60 U/L (ref 55–142)
ALT SERPL-CCNC: 35 U/L (ref 13–56)
ANION GAP SERPL CALC-SCNC: 3 MMOL/L (ref 0–18)
AST SERPL-CCNC: 19 U/L (ref 15–37)
BASOPHILS # BLD AUTO: 0.02 X10(3) UL (ref 0–0.2)
BASOPHILS NFR BLD AUTO: 0.1 %
BILIRUB SERPL-MCNC: 0.5 MG/DL (ref 0.1–2)
BUN BLD-MCNC: 25 MG/DL (ref 7–18)
BUN/CREAT SERPL: 27.8 (ref 10–20)
CALCIUM BLD-MCNC: 8.4 MG/DL (ref 8.5–10.1)
CHLORIDE SERPL-SCNC: 105 MMOL/L (ref 98–112)
CO2 SERPL-SCNC: 35 MMOL/L (ref 21–32)
CREAT BLD-MCNC: 0.9 MG/DL (ref 0.55–1.02)
DEPRECATED RDW RBC AUTO: 56.6 FL (ref 35.1–46.3)
EOSINOPHIL # BLD AUTO: 0.01 X10(3) UL (ref 0–0.7)
EOSINOPHIL NFR BLD AUTO: 0.1 %
ERYTHROCYTE [DISTWIDTH] IN BLOOD BY AUTOMATED COUNT: 15.4 % (ref 11–15)
GLOBULIN PLAS-MCNC: 2.3 G/DL (ref 2.8–4.4)
GLUCOSE BLD-MCNC: 100 MG/DL (ref 70–99)
HAV IGM SER QL: 2.7 MG/DL (ref 1.6–2.6)
HCT VFR BLD AUTO: 43.4 % (ref 35–48)
HGB BLD-MCNC: 13.6 G/DL (ref 12–16)
IMM GRANULOCYTES # BLD AUTO: 0.2 X10(3) UL (ref 0–1)
IMM GRANULOCYTES NFR BLD: 1.3 %
LYMPHOCYTES # BLD AUTO: 1.09 X10(3) UL (ref 1–4)
LYMPHOCYTES NFR BLD AUTO: 6.8 %
M PROTEIN MFR SERPL ELPH: 5.2 G/DL (ref 6.4–8.2)
MCH RBC QN AUTO: 31.2 PG (ref 26–34)
MCHC RBC AUTO-ENTMCNC: 31.3 G/DL (ref 31–37)
MCV RBC AUTO: 99.5 FL (ref 80–100)
MONOCYTES # BLD AUTO: 1.02 X10(3) UL (ref 0.1–1)
MONOCYTES NFR BLD AUTO: 6.4 %
NEUTROPHILS # BLD AUTO: 13.6 X10 (3) UL (ref 1.5–7.7)
NEUTROPHILS # BLD AUTO: 13.6 X10(3) UL (ref 1.5–7.7)
NEUTROPHILS NFR BLD AUTO: 85.3 %
OSMOLALITY SERPL CALC.SUM OF ELEC: 300 MOSM/KG (ref 275–295)
PLATELET # BLD AUTO: 147 10(3)UL (ref 150–450)
POTASSIUM SERPL-SCNC: 4.2 MMOL/L (ref 3.5–5.1)
RBC # BLD AUTO: 4.36 X10(6)UL (ref 3.8–5.3)
SODIUM SERPL-SCNC: 143 MMOL/L (ref 136–145)
WBC # BLD AUTO: 15.9 X10(3) UL (ref 4–11)

## 2019-12-19 PROCEDURE — 3E04305 INTRODUCTION OF OTHER ANTINEOPLASTIC INTO CENTRAL VEIN, PERCUTANEOUS APPROACH: ICD-10-PCS | Performed by: INTERNAL MEDICINE

## 2019-12-19 RX ORDER — DEXAMETHASONE 4 MG/1
4 TABLET ORAL EVERY 12 HOURS SCHEDULED
Status: COMPLETED | OUTPATIENT
Start: 2019-12-20 | End: 2019-12-21

## 2019-12-19 RX ORDER — FUROSEMIDE 10 MG/ML
20 INJECTION INTRAMUSCULAR; INTRAVENOUS ONCE
Status: DISCONTINUED | OUTPATIENT
Start: 2019-12-19 | End: 2019-12-19

## 2019-12-19 RX ORDER — FUROSEMIDE 10 MG/ML
40 INJECTION INTRAMUSCULAR; INTRAVENOUS ONCE
Status: DISCONTINUED | OUTPATIENT
Start: 2019-12-19 | End: 2019-12-19

## 2019-12-19 NOTE — PLAN OF CARE
A&Ox4. Up x1 SB. Productive cough per pt report. Regular diet, tolerating well. On 1 L O2 via NC. Voiding freely. BM today. Daily weight. Lovenox and SCDs. BLE swelling.  Reviewed with Dr. Vinny Morillo, since patient is reviewing mannitol post cisplatin, will h Care Plan goals for specific interventions   Outcome: Progressing     Problem: RESPIRATORY - ADULT  Goal: Achieves optimal ventilation and oxygenation  Description  INTERVENTIONS:  - Assess for changes in respiratory status  - Assess for changes in mentati ex. Angina  - Evaluate fluid balance, assess for edema, trend weights  Outcome: Progressing  Goal: Absence of cardiac arrhythmias or at baseline  Description  INTERVENTIONS:  - Continuous cardiac monitoring, monitor vital signs, obtain 12 lead EKG if indic

## 2019-12-19 NOTE — PROGRESS NOTES
[facility] Hospitalist Progress Note     PCP: None Pcp    Chief Complaint: follow-up    SUBJECTIVE:  - started on chemo  - is very fatigued  - still wheezing and coughing, +le edema, had good uop w/ lasix, but weight is up    OBJECTIVE:  Temp:  [98.2 °F (36.8 ° infusion in saline  12.5 g Intravenous Once   • CISplatin (PLATINOL) chemo infusion  75 mg/m2 Intravenous Once   • etoposide (VEPESID, -16) chemo infusion  100 mg/m2 Intravenous Once per day on Thu Fri Sat   • potassium chloride and magnesium sulfate IV will start low dose amlodipine and uptitrate as needed  - PRN hydralazine ok     Hypokalemia  - repletion protocol     Nicotine use disorder  - agreeable to patch    Dispo - pending improvement      Janeen Villatoro DO  [facility] Hospitalist  Pager: [phone]

## 2019-12-19 NOTE — CONSULTS
[facility]    Report of Consultation    Maura Maynard Patient Status:  Inpatient    9/15/1945 MRN [id number]   Location [facility] 4W/SW/SE Attending Jaya Burgess, [address] [facility] Day # 4 PCP None Pcp     Date of A puff, Inhalation, Daily  •  ipratropium-albuterol (DUONEB) nebulizer solution 3 mL, 3 mL, Nebulization, Q6H WA  •  Normal Saline Flush 0.9 % injection 3 mL, 3 mL, Intravenous, PRN  •  acetaminophen (TYLENOL) tab 650 mg, 650 mg, Oral, Q4H PRN **OR** [DISCON 12/17/2019    .0 12/17/2019    CREATSERUM 0.84 12/18/2019    BUN 32 (H) 12/18/2019     12/18/2019    K 4.6 12/18/2019     12/18/2019    CO2 34.0 (H) 12/18/2019     (H) 12/18/2019    CA 8.3 (L) 12/18/2019    ALB 3.5 12/16/2019    A

## 2019-12-19 NOTE — PROGRESS NOTES
Pulmonary Progress Note     Assessment / Plan:  1. Acute hypoxemic resp failure  - due to lung mass and possible AECOPD  - steroids as below  - wean O2 as tolerated   2. COPD exacerbation  - IV to po steroids today  - breo  - BD protocol  3.  SCLC  - based

## 2019-12-19 NOTE — PLAN OF CARE
Date:12/19/2019  Drug name and dosage administered  Cisplatin 131mg and Etoposide 180mg     Drug Sequence:  Cisplatin then Etoposide     Location of intravenous site (e.g. Central line, peripheral line): L Midline-ok per Dr. Jen Mckeon to use for chemo.

## 2019-12-19 NOTE — PROGRESS NOTES
Subjective      S/p mid line yesterday to initiate chemotherapy today. She is slightly nervous but feels okay    PERTINENT HISTORY:     History:  History reviewed. No pertinent past medical history.   Past Surgical History:   Procedure Laterality Date WA  •  Normal Saline Flush 0.9 % injection 3 mL, 3 mL, Intravenous, PRN  •  acetaminophen (TYLENOL) tab 650 mg, 650 mg, Oral, Q4H PRN **OR** [DISCONTINUED] HYDROcodone-acetaminophen (NORCO) 5-325 MG per tab 1 tablet, 1 tablet, Oral, Q4H PRN **OR** [DISCONT Potassium 4.2 3.5 - 5.1 mmol/L    Chloride 105 98 - 112 mmol/L    CO2 35.0 (H) 21.0 - 32.0 mmol/L    Anion Gap 3 0 - 18 mmol/L    BUN 25 (H) 7 - 18 mg/dL    Creatinine 0.90 0.55 - 1.02 mg/dL    BUN/CREA Ratio 27.8 (H) 10.0 - 20.0    Calcium, Total 8.4 (L) 13.8 13.4 13.6   HCT 48.5* 45.8 44.5 43.1 43.4   MCV 93.8 96.2 96.7 97.1 99.5   MCH 30.4 30.3 30.0 30.2 31.2   MCHC 32.4 31.4 31.0 31.1 31.3   RDW 14.9 15.0 15.4* 15.4* 15.4*   NEPRELIM 14.39* 12.10*  --   --  13.60*   WBC 16.8* 14.5* 12.2* 17.4* 15.9*   P gallbladder fundus, in association with a Phrygian cap. Consider follow-up right upper quadrant abdominal sonography for further assessment. 3. Infrarenal abdominal aortic ectasia. 4. Uncomplicated colonic diverticulosis. 5. Status post appendectomy. bronchoscopy, cytology consistent with small cell carcinoma  - s/p midline, unable to place picc or port due to svc syndrome.  Chemotherapy orders sent to pharmacy  - reviewed regimen with patient, plan for inpatient cis/etoposide , cycle 1 while inpatient

## 2019-12-19 NOTE — PLAN OF CARE
Pt alert and oriented x4. VSS. On 1L of oxygen. Lovenox for DVT prophylaxis. Tolerating general diet, no nausea. Tylenol given for headache 1x, no complaints of pain since. IV solumedrol given, plan to switch to oral prednisone tomorrow. Lasix IV given 1x. for changes in mentation and behavior  - Position to facilitate oxygenation and minimize respiratory effort  - Oxygen supplementation based on oxygen saturation or ABGs  - Provide Smoking Cessation handout, if applicable  - Encourage broncho-pulmonary hygi obtain 12 lead EKG if indicated  - Evaluate effectiveness of antiarrhythmic and heart rate control medications as ordered  - Initiate emergency measures for life threatening arrhythmias  - Monitor electrolytes and administer replacement therapy as ordered

## 2019-12-19 NOTE — PLAN OF CARE
VSS, afebrile. On 2LO2. Up with SBA. Productive cough. No c/o pain. Voiding well. Saline locked. Midline flushed. Nicotine patch in place. Safety plan in place, calling appropriately for assistance. Plan for 1st chemo today.      Problem: Patient Centered C retention  Outcome: Progressing     Problem: COPING  Goal: Pt/Family able to verbalize concerns and demonstrate effective coping strategies  Description  INTERVENTIONS:  - Assist patient/family to identify coping skills, available support systems and cultu

## 2019-12-20 LAB
ANION GAP SERPL CALC-SCNC: 2 MMOL/L (ref 0–18)
BASOPHILS # BLD AUTO: 0.02 X10(3) UL (ref 0–0.2)
BASOPHILS NFR BLD AUTO: 0.1 %
BUN BLD-MCNC: 22 MG/DL (ref 7–18)
BUN/CREAT SERPL: 28.9 (ref 10–20)
CALCIUM BLD-MCNC: 8 MG/DL (ref 8.5–10.1)
CHLORIDE SERPL-SCNC: 106 MMOL/L (ref 98–112)
CO2 SERPL-SCNC: 33 MMOL/L (ref 21–32)
CREAT BLD-MCNC: 0.76 MG/DL (ref 0.55–1.02)
DEPRECATED RDW RBC AUTO: 56.9 FL (ref 35.1–46.3)
EOSINOPHIL # BLD AUTO: 0 X10(3) UL (ref 0–0.7)
EOSINOPHIL NFR BLD AUTO: 0 %
ERYTHROCYTE [DISTWIDTH] IN BLOOD BY AUTOMATED COUNT: 15.4 % (ref 11–15)
GLUCOSE BLD-MCNC: 122 MG/DL (ref 70–99)
HAV IGM SER QL: 2.9 MG/DL (ref 1.6–2.6)
HCT VFR BLD AUTO: 41.9 % (ref 35–48)
HGB BLD-MCNC: 12.9 G/DL (ref 12–16)
IMM GRANULOCYTES # BLD AUTO: 0.16 X10(3) UL (ref 0–1)
IMM GRANULOCYTES NFR BLD: 1.2 %
LYMPHOCYTES # BLD AUTO: 0.48 X10(3) UL (ref 1–4)
LYMPHOCYTES NFR BLD AUTO: 3.5 %
MCH RBC QN AUTO: 30.9 PG (ref 26–34)
MCHC RBC AUTO-ENTMCNC: 30.8 G/DL (ref 31–37)
MCV RBC AUTO: 100.2 FL (ref 80–100)
MONOCYTES # BLD AUTO: 0.62 X10(3) UL (ref 0.1–1)
MONOCYTES NFR BLD AUTO: 4.6 %
NEUTROPHILS # BLD AUTO: 12.31 X10 (3) UL (ref 1.5–7.7)
NEUTROPHILS # BLD AUTO: 12.31 X10(3) UL (ref 1.5–7.7)
NEUTROPHILS NFR BLD AUTO: 90.6 %
OSMOLALITY SERPL CALC.SUM OF ELEC: 297 MOSM/KG (ref 275–295)
PLATELET # BLD AUTO: 167 10(3)UL (ref 150–450)
POTASSIUM SERPL-SCNC: 4.4 MMOL/L (ref 3.5–5.1)
RBC # BLD AUTO: 4.18 X10(6)UL (ref 3.8–5.3)
SODIUM SERPL-SCNC: 141 MMOL/L (ref 136–145)
WBC # BLD AUTO: 13.6 X10(3) UL (ref 4–11)

## 2019-12-20 RX ORDER — DOCUSATE SODIUM 100 MG/1
100 CAPSULE, LIQUID FILLED ORAL 2 TIMES DAILY
Status: DISCONTINUED | OUTPATIENT
Start: 2019-12-20 | End: 2020-01-02

## 2019-12-20 RX ORDER — IPRATROPIUM BROMIDE AND ALBUTEROL SULFATE 2.5; .5 MG/3ML; MG/3ML
3 SOLUTION RESPIRATORY (INHALATION) EVERY 4 HOURS PRN
Status: DISCONTINUED | OUTPATIENT
Start: 2019-12-20 | End: 2020-01-20

## 2019-12-20 RX ORDER — POLYETHYLENE GLYCOL 3350 17 G/17G
17 POWDER, FOR SOLUTION ORAL DAILY
Status: DISCONTINUED | OUTPATIENT
Start: 2019-12-20 | End: 2020-01-02

## 2019-12-20 RX ORDER — BISACODYL 10 MG
10 SUPPOSITORY, RECTAL RECTAL
Status: DISCONTINUED | OUTPATIENT
Start: 2019-12-20 | End: 2020-01-20

## 2019-12-20 RX ORDER — FUROSEMIDE 10 MG/ML
40 INJECTION INTRAMUSCULAR; INTRAVENOUS ONCE
Status: COMPLETED | OUTPATIENT
Start: 2019-12-20 | End: 2019-12-20

## 2019-12-20 NOTE — PLAN OF CARE
Problem: Patient Centered Care  Goal: Patient preferences are identified and integrated in the patient's plan of care  Description  Interventions:  - What would you like us to know as we care for you? I haven't been to a doctor in a long time.   I like to difficulty  - Respiratory Therapy support as indicated  - Manage/alleviate anxiety  - Monitor for signs/symptoms of CO2 retention  Outcome: Progressing     Problem: CARDIOVASCULAR - ADULT  Goal: Maintains optimal cardiac output and hemodynamic stability  D changes during the night.

## 2019-12-20 NOTE — PROGRESS NOTES
Pulmonary Progress Note     Assessment / Plan:  1. Acute hypoxemic resp failure  - due to lung mass and possible AECOPD  - steroids as below  - wean O2 as tolerated   2. COPD exacerbation  - prednisone course completed  - breo  - bd protocol  3.  SCLC  - ba

## 2019-12-20 NOTE — PROGRESS NOTES
Subjective      S/p day of chemotherapy yesterday  Denies any symptoms today    PERTINENT HISTORY:     History:  History reviewed. No pertinent past medical history.   Past Surgical History:   Procedure Laterality Date   • APPENDECTOMY     • BRONCHOSCOPY [facility]) injection 4 mg, 4 mg, Intravenous, Q6H PRN  •  Metoclopramide HCl (REGLAN) injection 10 mg, 10 mg, Intravenous, Q8H PRN  •  influenza vaccine (PF)(FLUZONE HD) high dose for 65 yrs & older inj 0.5ml, 0.5 mL, Intramuscular, Prior to discharge  •  ni -American 88 >=60   MAGNESIUM    Collection Time: 12/20/19  5:31 AM   Result Value Ref Range    Magnesium 2.9 (H) 1.6 - 2.6 mg/dL   CBC W/ DIFFERENTIAL    Collection Time: 12/20/19  5:31 AM   Result Value Ref Range    WBC 13.6 (H) 4.0 - 11.0 x10(3) right upper lobar pulmonary artery. The mass involves the trachea/sonal and right main bronchus without airway narrowing. A small central nodule in the right upper lobe appears contiguous with this mass and could represent.  2. Ascending thoracic aorta i Dictated by (CST): Genaro Cha MD on 12/14/2019 at 19:28     Approved by (CST): Genaro Cha MD on 12/14/2019 at 19:30              PROBLEM LIST:     Patient Active Problem List:     Lung mass     Swelling of lower extremity     Hypoxia

## 2019-12-20 NOTE — PROGRESS NOTES
[facility] Hospitalist Progress Note     PCP: None Pcp    Chief Complaint: follow-up    SUBJECTIVE:  - complains of more leg swelling, no increased shortness of breath, still with cough, unable to cough up phlegm    OBJECTIVE:  Temp:  [97.4 °F (36.3 °C)-98.3 °F hours. No results for input(s): TROP in the last 168 hours.       Meds:     • docusate sodium  100 mg Oral BID   • PEG 3350  17 g Oral Daily   • etoposide (VEPESID, -16) chemo infusion  100 mg/m2 Intravenous Once per day on Thu Fri Sat   • dexamethason hydralazine ok     Hypokalemia  - repletion protocol     Nicotine use disorder  - agreeable to patch    Dispo - possibly home Sunday     Avinash De Leon MD  [facility] Hospitalist  Answering Service: [phone]    Time spent with patient: 35 mins with > 50% s

## 2019-12-20 NOTE — PLAN OF CARE
Date:12/19/19  Per protocol chemotherapy medication  checked with second nurse Asmita Paul RN   Drug name and dosage administered: Etoposide 180 mg     Drug Sequence: see previous chemotherapy note      Location of intravenous site (e.g. Central neda

## 2019-12-20 NOTE — PLAN OF CARE
A&Ox4. Up x1 SB. Left Midline SL +BR . General diet, tolerating well. BLE edema, L>R increasing. LUE edema. Daily weight, also went up. Dr. Adin Perez notified of LUE swelling and weight gain, and BLE swelling. Lasix ordered.  Titrated to room air, but wh oxygenation  Description  INTERVENTIONS:  - Assess for changes in respiratory status  - Assess for changes in mentation and behavior  - Position to facilitate oxygenation and minimize respiratory effort  - Oxygen supplementation based on oxygen saturation baseline  Description  INTERVENTIONS:  - Continuous cardiac monitoring, monitor vital signs, obtain 12 lead EKG if indicated  - Evaluate effectiveness of antiarrhythmic and heart rate control medications as ordered  - Initiate emergency measures for life t

## 2019-12-21 LAB
ANION GAP SERPL CALC-SCNC: 3 MMOL/L (ref 0–18)
BUN BLD-MCNC: 26 MG/DL (ref 7–18)
BUN/CREAT SERPL: 27.1 (ref 10–20)
CALCIUM BLD-MCNC: 8.5 MG/DL (ref 8.5–10.1)
CHLORIDE SERPL-SCNC: 101 MMOL/L (ref 98–112)
CO2 SERPL-SCNC: 34 MMOL/L (ref 21–32)
CREAT BLD-MCNC: 0.96 MG/DL (ref 0.55–1.02)
DEPRECATED RDW RBC AUTO: 55.8 FL (ref 35.1–46.3)
ERYTHROCYTE [DISTWIDTH] IN BLOOD BY AUTOMATED COUNT: 15.1 % (ref 11–15)
GLUCOSE BLD-MCNC: 112 MG/DL (ref 70–99)
HCT VFR BLD AUTO: 44.8 % (ref 35–48)
HGB BLD-MCNC: 14.2 G/DL (ref 12–16)
MCH RBC QN AUTO: 31.2 PG (ref 26–34)
MCHC RBC AUTO-ENTMCNC: 31.7 G/DL (ref 31–37)
MCV RBC AUTO: 98.5 FL (ref 80–100)
OSMOLALITY SERPL CALC.SUM OF ELEC: 292 MOSM/KG (ref 275–295)
PLATELET # BLD AUTO: 185 10(3)UL (ref 150–450)
POTASSIUM SERPL-SCNC: 4.8 MMOL/L (ref 3.5–5.1)
RBC # BLD AUTO: 4.55 X10(6)UL (ref 3.8–5.3)
SODIUM SERPL-SCNC: 138 MMOL/L (ref 136–145)
WBC # BLD AUTO: 22.1 X10(3) UL (ref 4–11)

## 2019-12-21 RX ORDER — FUROSEMIDE 10 MG/ML
20 INJECTION INTRAMUSCULAR; INTRAVENOUS ONCE
Status: COMPLETED | OUTPATIENT
Start: 2019-12-21 | End: 2019-12-21

## 2019-12-21 NOTE — PLAN OF CARE
Problem: Patient Centered Care  Goal: Patient preferences are identified and integrated in the patient's plan of care  Description  Interventions:  - What would you like us to know as we care for you? I haven't been to a doctor in a long time.   I like to support as indicated  - Manage/alleviate anxiety  - Monitor for signs/symptoms of CO2 retention  Outcome: Progressing     Problem: COPING  Goal: Pt/Family able to verbalize concerns and demonstrate effective coping strategies  Description  INTERVENTIONS: maintained. Chemo administered; tolerated well; see chemo note.

## 2019-12-21 NOTE — PLAN OF CARE
Per protocol chemotherapy medication  checked with second nurse Kranthi Dia RN   Drug name and dosage administered: Etoposide 180 mg      Drug Sequence: see previous chemotherapy note        Location of intravenous site (e.g. Central line, peripheral li

## 2019-12-21 NOTE — PROGRESS NOTES
[facility] Hospitalist Progress Note     CC: Hospital Follow up    PCP: None Pcp       Assessment/Plan:     Principal Problem:    Lung mass  Active Problems:    Swelling of lower extremity    Hypoxia    Cate Maguire 66year old female with PMH sig- unknown (ha at 12/21/2019 1420  Last data filed at 12/21/2019 1345  Gross per 24 hour   Intake 784 ml   Output 4625 ml   Net -3841 ml       Last 3 Weights  12/21/19 0530 : 171 lb 11.2 oz (77.9 kg)  12/20/19 1311 : 174 lb 6.1 oz (79.1 kg)  12/20/19 1210 : 178 lb 4.8 oz amLODIPine Besylate  2.5 mg Oral Daily   • Fluticasone Furoate-Vilanterol  1 puff Inhalation Daily   • ipratropium-albuterol  3 mL Nebulization Q6H WA   • nicotine  1 patch Transdermal Daily       ipratropium-albuterol, ondansetron (ZOFRAN) IVPB, bisacodyl

## 2019-12-22 ENCOUNTER — APPOINTMENT (OUTPATIENT)
Dept: GENERAL RADIOLOGY | Facility: HOSPITAL | Age: 74
DRG: 177 | End: 2019-12-22
Attending: HOSPITALIST
Payer: MEDICARE

## 2019-12-22 LAB
ANION GAP SERPL CALC-SCNC: 3 MMOL/L (ref 0–18)
BUN BLD-MCNC: 26 MG/DL (ref 7–18)
BUN/CREAT SERPL: 24.3 (ref 10–20)
CALCIUM BLD-MCNC: 8.5 MG/DL (ref 8.5–10.1)
CHLORIDE SERPL-SCNC: 96 MMOL/L (ref 98–112)
CO2 SERPL-SCNC: 33 MMOL/L (ref 21–32)
CREAT BLD-MCNC: 1.07 MG/DL (ref 0.55–1.02)
GLUCOSE BLD-MCNC: 115 MG/DL (ref 70–99)
OSMOLALITY SERPL CALC.SUM OF ELEC: 280 MOSM/KG (ref 275–295)
POTASSIUM SERPL-SCNC: 4.7 MMOL/L (ref 3.5–5.1)
SODIUM SERPL-SCNC: 132 MMOL/L (ref 136–145)

## 2019-12-22 PROCEDURE — 71045 X-RAY EXAM CHEST 1 VIEW: CPT | Performed by: HOSPITALIST

## 2019-12-22 RX ORDER — METOCLOPRAMIDE HYDROCHLORIDE 5 MG/ML
5 INJECTION INTRAMUSCULAR; INTRAVENOUS EVERY 8 HOURS PRN
Status: DISCONTINUED | OUTPATIENT
Start: 2019-12-22 | End: 2020-01-20

## 2019-12-22 RX ORDER — PREDNISONE 20 MG/1
40 TABLET ORAL
Status: DISCONTINUED | OUTPATIENT
Start: 2019-12-22 | End: 2019-12-28

## 2019-12-22 NOTE — PLAN OF CARE
Etoposide given in the evening. Pt weak, needs some assistance when up. May anticipate discharge today.   Problem: Patient Centered Care  Goal: Patient preferences are identified and integrated in the patient's plan of care  Description  Interventions:  - perform as needed  - Assess and instruct to report SOB or any respiratory difficulty  - Respiratory Therapy support as indicated  - Manage/alleviate anxiety  - Monitor for signs/symptoms of CO2 retention  Outcome: Progressing     Problem: CARDIOVASCULAR -

## 2019-12-22 NOTE — PLAN OF CARE
Laura Mckenzie has had some increase of shortness of breath today. CXR showed pneumonia, zosyn started. VSS. Up with standby assist and the walker. Pt stated that she is feeling weaker today, PT/OT ordered. Poor appetite.    Problem: Patient Centered Care  Goal: broncho-pulmonary hygiene including cough, deep breathe, Incentive Spirometry  - Assess the need for suctioning and perform as needed  - Assess and instruct to report SOB or any respiratory difficulty  - Respiratory Therapy support as indicated  - Manage/a therapy as ordered  Outcome: Progressing

## 2019-12-22 NOTE — PLAN OF CARE
Date:  Drug name and dosage administered etoposide 180mg    Location of intravenous site (e.g. Central line, peripheral line):left forearm midline    Needle size, type, location:left forearm midline    Type of pump (if infusion): russell    Verification of

## 2019-12-22 NOTE — PROGRESS NOTES
[facility] PULMONARY[facility]CRITICAL [facility]ARE    S: Patient was started on Zosyn today after noted to have a new RUL infiltrate on CXR. Pt reports more shortness of breath today and more cough. She's coughing up \"orange \" phlegm. She has some right-sided chest discomfort. > 100* 122* 112* 115*   BUN  --    < > 25* 22* 26* 26*   CREATSERUM  --    < > 0.90 0.76 0.96 1.07*   GFRAA  --    < > 72 88 66 58*   GFRNAA  --    < > 62 76 58* 51*   CA  --    < > 8.4* 8.0* 8.5 8.5   ALB 3.5  --  2.9*  --   --   --    NA  --    < > 143 1

## 2019-12-22 NOTE — PROGRESS NOTES
[facility] Pharmacy Note:  Renal Dose Adjustment for Metoclopramide (REGLAN)    June Cabral has been prescribed Metoclopramide (REGLAN) 10 mg every 8 hours as needed for N/V. Estimated Creatinine Clearance: 35.4 mL/min (A) (based on SCr of 1.07 mg/dL (H)).

## 2019-12-23 ENCOUNTER — APPOINTMENT (OUTPATIENT)
Dept: ULTRASOUND IMAGING | Facility: HOSPITAL | Age: 74
DRG: 177 | End: 2019-12-23
Attending: INTERNAL MEDICINE
Payer: MEDICARE

## 2019-12-23 ENCOUNTER — APPOINTMENT (OUTPATIENT)
Dept: GENERAL RADIOLOGY | Facility: HOSPITAL | Age: 74
DRG: 177 | End: 2019-12-23
Attending: EMERGENCY MEDICINE
Payer: MEDICARE

## 2019-12-23 LAB
ANION GAP SERPL CALC-SCNC: 5 MMOL/L (ref 0–18)
BILIRUB UR QL: NEGATIVE
BUN BLD-MCNC: 28 MG/DL (ref 7–18)
BUN/CREAT SERPL: 17.4 (ref 10–20)
CALCIUM BLD-MCNC: 9 MG/DL (ref 8.5–10.1)
CHLORIDE SERPL-SCNC: 96 MMOL/L (ref 98–112)
CO2 SERPL-SCNC: 34 MMOL/L (ref 21–32)
COLOR UR: YELLOW
CREAT BLD-MCNC: 1.61 MG/DL (ref 0.55–1.02)
CREAT UR-SCNC: 46.2 MG/DL
DEPRECATED RDW RBC AUTO: 53.7 FL (ref 35.1–46.3)
ERYTHROCYTE [DISTWIDTH] IN BLOOD BY AUTOMATED COUNT: 15 % (ref 11–15)
GLUCOSE BLD-MCNC: 127 MG/DL (ref 70–99)
GLUCOSE BLDC GLUCOMTR-MCNC: 114 MG/DL (ref 70–99)
GLUCOSE UR-MCNC: NEGATIVE MG/DL
HCT VFR BLD AUTO: 48.4 % (ref 35–48)
HGB BLD-MCNC: 15.7 G/DL (ref 12–16)
KETONES UR-MCNC: NEGATIVE MG/DL
MCH RBC QN AUTO: 31.2 PG (ref 26–34)
MCHC RBC AUTO-ENTMCNC: 32.4 G/DL (ref 31–37)
MCV RBC AUTO: 96.2 FL (ref 80–100)
NITRITE UR QL STRIP.AUTO: NEGATIVE
OSMOLALITY SERPL CALC.SUM OF ELEC: 287 MOSM/KG (ref 275–295)
PH UR: 5 [PH] (ref 5–8)
PLATELET # BLD AUTO: 148 10(3)UL (ref 150–450)
POTASSIUM SERPL-SCNC: 4.2 MMOL/L (ref 3.5–5.1)
PROT UR-MCNC: 101 MG/DL
PROT UR-MCNC: 30 MG/DL
RBC # BLD AUTO: 5.03 X10(6)UL (ref 3.8–5.3)
RBC #/AREA URNS AUTO: 2 /HPF
SODIUM SERPL-SCNC: 135 MMOL/L (ref 136–145)
SODIUM SERPL-SCNC: 28 MMOL/L
SP GR UR STRIP: 1.01 (ref 1–1.03)
UROBILINOGEN UR STRIP-ACNC: <2
UUN UR-MCNC: 545 MG/DL
WBC # BLD AUTO: 18.4 X10(3) UL (ref 4–11)
WBC #/AREA URNS AUTO: 7 /HPF

## 2019-12-23 PROCEDURE — 71045 X-RAY EXAM CHEST 1 VIEW: CPT | Performed by: EMERGENCY MEDICINE

## 2019-12-23 PROCEDURE — 76770 US EXAM ABDO BACK WALL COMP: CPT | Performed by: INTERNAL MEDICINE

## 2019-12-23 RX ORDER — DEXAMETHASONE SODIUM PHOSPHATE 10 MG/ML
10 INJECTION, SOLUTION INTRAMUSCULAR; INTRAVENOUS ONCE
Status: COMPLETED | OUTPATIENT
Start: 2019-12-23 | End: 2019-12-23

## 2019-12-23 RX ORDER — IPRATROPIUM BROMIDE AND ALBUTEROL SULFATE 2.5; .5 MG/3ML; MG/3ML
3 SOLUTION RESPIRATORY (INHALATION) CONTINUOUS
Status: DISCONTINUED | OUTPATIENT
Start: 2019-12-23 | End: 2019-12-27

## 2019-12-23 RX ORDER — VANCOMYCIN 2 GRAM/500 ML IN 0.9 % SODIUM CHLORIDE INTRAVENOUS
25 ONCE
Status: COMPLETED | OUTPATIENT
Start: 2019-12-23 | End: 2019-12-23

## 2019-12-23 RX ORDER — VANCOMYCIN HYDROCHLORIDE
1250 EVERY 24 HOURS
Status: DISCONTINUED | OUTPATIENT
Start: 2019-12-24 | End: 2019-12-26

## 2019-12-23 NOTE — PROGRESS NOTES
Pulmonary Progress Note     Assessment / Plan:  1. Acute hypoxemic resp failure - due to lung mass, AECOPD, and now RUL pneumonia  - continue heated high flow nasal cannula; on 50% FiO2  - abx and steroids as below  - bd protocol  2.  Pneumonia - right uppe

## 2019-12-23 NOTE — PROGRESS NOTES
[facility] Dosing Service    Initial Pharmacokinetic Consult for Vancomycin Dosing     Ash Crain is a 66year old female who is being treated for pneumonia.   Pharmacy has been asked to dose Vancomycin by Dr. Delphine Melo MD.    She has No Know

## 2019-12-23 NOTE — PHYSICAL THERAPY NOTE
Rapid Response called this AM- increased work of breathing + tachy with mobility to bathroom/toileting tasks.   possible transition to Michela Marcus- will continue to follow patient and check on her later

## 2019-12-23 NOTE — PROGRESS NOTES
[facility]    CDI Prediction Tool Protocol (Vancomycin Initiated)    OVP (oral vancomycin prophylaxis) 125 mg PO Daily is being started in this patient based on a score of 14.       Score Breakdown:  High risk antibiotic use (5 points)  Hospital

## 2019-12-23 NOTE — DIETARY NOTE
ADULT NUTRITION INITIAL ASSESSMENT    Pt is at moderate nutrition risk. Pt does not meet malnutrition criteria.       RECOMMENDATIONS TO MD:  See Nutrition Intervention     NUTRITION DIAGNOSIS/PROBLEM:  Inadequate oral intake related to decreased ability 12/20/19 0817  100 %    12/21/19 1050  90 %    12/21/19 1545  90 %      Intake Meeting Needs: No, but supplements to maximize    Food Allergies: No  Cultural/Ethnic/Moravian Preferences: Not Obtained    MEDICATIONS: reviewed  • vancomycin HCl  125 mg Oral

## 2019-12-23 NOTE — SIGNIFICANT EVENT
Rapid response     Called for hypoxia and increased work of breathing after patient got up and went to bathroom. Patient with new diagnosis of lung cancer with pneumonia on abx. She is tachypneic, tachycardic with rhonchorous cough.   Sats improved with n

## 2019-12-23 NOTE — OCCUPATIONAL THERAPY NOTE
Rapid Response called this AM- increased work of breathing + tachy with mobility to bathroom/toileting tasks; pt is currently getting a nebulizer treatment and possible transition to Airvo- will continue to follow patient and check on her later this PM to

## 2019-12-23 NOTE — PLAN OF CARE
Pt complaining of some abdominal pain overnight, no medications given. Pt was continuously coughing up pink tinged sputum. Pt got up to use the bathroom, after coming back to the bathroom, pt was extremely tachy and SOB.  Rapid response was called, see note respiratory effort  - Oxygen supplementation based on oxygen saturation or ABGs  - Provide Smoking Cessation handout, if applicable  - Encourage broncho-pulmonary hygiene including cough, deep breathe, Incentive Spirometry  - Assess the need for suctioning patient/family in relaxation techniques, as appropriate  - Assess for spiritual and psychosocial needs and initiate Spiritual Care or Behavioral Health consult as needed  Outcome: Progressing     Problem: SAFETY ADULT - FALL  Goal: Free from fall injury  D

## 2019-12-23 NOTE — PROGRESS NOTES
RRT Called    Reason the RRT was called: Increased SOB, low O2 sats, & tachycardia. Assessment of patient leading up to RRT: Pt got up to use the bathroom, when she got back to bed she was tachycardiac & extremely SOB, her O2 sat was 78% on 3 L NC.   Inte

## 2019-12-23 NOTE — PROGRESS NOTES
[facility] Hospitalist Progress Note     CC: Hospital Follow up    PCP: None Pcp       Assessment/Plan:     Principal Problem:    Lung mass  Active Problems:    Swelling of lower extremity    Hypoxia    Erick Second 66year old female with PMH sig- unknown (ha PNA    OBJECTIVE:    Blood pressure 118/59, pulse 106, temperature 97.5 °F (36.4 °C), temperature source Oral, resp. rate 18, height 5' 2\" (1.575 m), weight 163 lb 9.6 oz (74.2 kg), SpO2 98 %.     Temp:  [97.5 °F (36.4 °C)-98.7 °F (37.1 °C)] 97.5 °F (36.4 12/19/19  0639   ALT 35   AST 19   ALB 2.9*         Imaging:  Xr Chest Ap Portable  (cpt=71045)    Result Date: 12/23/2019  CONCLUSION:  1.  Slight worsening of the consolidation/atelectasis in the right upper lobe when compared to December 22, 2019. 2. Christian HYDROcodone-acetaminophen, ondansetron HCl, influenza virus vaccine PF, hydrALAzine HCl

## 2019-12-23 NOTE — CONSULTS
[facility]    Report of Consultation    Linda Zendejas Patient Status:  Inpatient    9/15/1945 MRN [id number]   Location [facility] 4W/SW/SE Attending Tracey Chatman MD   [facility] Day # 9 PCP None Pcp     Date of Admission:   12/24/2019] vancomycin IVPB premix 1.25g in 0.9% NaCl 250 mL, 1,250 mg, Intravenous, Q24H  predniSONE (DELTASONE) tab 40 mg, 40 mg, Oral, Daily with breakfast  Metoclopramide HCl (REGLAN) injection 5 mg, 5 mg, Intravenous, Q8H PRN  ipratropium-albuterol (D membranes. EOM-I. PERRL  Neck: No JVD. Respiratory: Bilateral rhonchi. Cardiovascular: S1, S2.  Regular rate and rhythm. No murmurs. Abdomen: Soft, nontender, nondistended. Positive bowel sounds.   Ext: 1-2+ LE edema      Results:     Laboratory Data Impression:      This is a 66year old with recently diagnosed SCLC, HTN, admitted with SOB and LE edema:    MEAGHAN:  - Etiology likely related to diuretic use ( 12/18,20,21), compounded by cisplatin(12/19)  - Will obtain urine lytes  - check renal ultra

## 2019-12-23 NOTE — PLAN OF CARE
Pt alert and orientated. Patient states she is feeling much better this morning. Vapotherm in place; attempted to wean off; however patient de-sated. Saturations stable at 96%. --- Now on 8L HF NC and o2 stable. Denies pain or nausea.  Denies SOB or chest p strategies  Description  INTERVENTIONS:  - Assist patient/family to identify coping skills, available support systems and cultural and spiritual values  - Provide emotional support, including active listening and acknowledgement of concerns of patient and based on assessment  - Modify environment to reduce risk of injury  - Provide assistive devices as appropriate  - Consider OT/PT consult to assist with strengthening/mobility  - Encourage toileting schedule  Outcome: Progressing

## 2019-12-24 LAB
ALBUMIN SERPL-MCNC: 1.8 G/DL (ref 3.4–5)
ANION GAP SERPL CALC-SCNC: 5 MMOL/L (ref 0–18)
BASOPHILS # BLD: 0 X10(3) UL (ref 0–0.2)
BASOPHILS NFR BLD: 0 %
BUN BLD-MCNC: 42 MG/DL (ref 7–18)
BUN/CREAT SERPL: 29.4 (ref 10–20)
CALCIUM BLD-MCNC: 8.3 MG/DL (ref 8.5–10.1)
CHLORIDE SERPL-SCNC: 101 MMOL/L (ref 98–112)
CO2 SERPL-SCNC: 33 MMOL/L (ref 21–32)
CREAT BLD-MCNC: 1.43 MG/DL (ref 0.55–1.02)
DEPRECATED RDW RBC AUTO: 51.1 FL (ref 35.1–46.3)
DOHLE BOD BLD QL SMEAR: PRESENT
EOSINOPHIL # BLD: 0 X10(3) UL (ref 0–0.7)
EOSINOPHIL NFR BLD: 0 %
ERYTHROCYTE [DISTWIDTH] IN BLOOD BY AUTOMATED COUNT: 14.6 % (ref 11–15)
GLUCOSE BLD-MCNC: 116 MG/DL (ref 70–99)
HAV IGM SER QL: 2.6 MG/DL (ref 1.6–2.6)
HCT VFR BLD AUTO: 38.1 % (ref 35–48)
HGB BLD-MCNC: 12.4 G/DL (ref 12–16)
LYMPHOCYTES NFR BLD: 0.35 X10(3) UL (ref 1–4)
LYMPHOCYTES NFR BLD: 5 %
MCH RBC QN AUTO: 31.1 PG (ref 26–34)
MCHC RBC AUTO-ENTMCNC: 32.5 G/DL (ref 31–37)
MCV RBC AUTO: 95.5 FL (ref 80–100)
MONOCYTES # BLD: 0 X10(3) UL (ref 0.1–1)
MONOCYTES NFR BLD: 0 %
MORPHOLOGY: NORMAL
NEUTROPHILS # BLD AUTO: 5.69 X10 (3) UL (ref 1.5–7.7)
NEUTROPHILS NFR BLD: 82 %
NEUTS BAND NFR BLD: 13 %
NEUTS HYPERSEG # BLD: 6.65 X10(3) UL (ref 1.5–7.7)
OSMOLALITY SERPL CALC.SUM OF ELEC: 299 MOSM/KG (ref 275–295)
PHOSPHATE SERPL-MCNC: 3.8 MG/DL (ref 2.5–4.9)
PLATELET # BLD AUTO: 98 10(3)UL (ref 150–450)
PLATELET MORPHOLOGY: NORMAL
POTASSIUM SERPL-SCNC: 3.1 MMOL/L (ref 3.5–5.1)
RBC # BLD AUTO: 3.99 X10(6)UL (ref 3.8–5.3)
SODIUM SERPL-SCNC: 139 MMOL/L (ref 136–145)
TOTAL CELLS COUNTED: 100
WBC # BLD AUTO: 7 X10(3) UL (ref 4–11)

## 2019-12-24 RX ORDER — POTASSIUM CHLORIDE 14.9 MG/ML
20 INJECTION INTRAVENOUS ONCE
Status: COMPLETED | OUTPATIENT
Start: 2019-12-24 | End: 2019-12-24

## 2019-12-24 RX ORDER — ENOXAPARIN SODIUM 100 MG/ML
30 INJECTION SUBCUTANEOUS EVERY 24 HOURS
Status: DISCONTINUED | OUTPATIENT
Start: 2019-12-24 | End: 2019-12-25

## 2019-12-24 NOTE — PROGRESS NOTES
[facility]    Progress Note    Charmaine Fernández Patient Status:  Inpatient    9/15/1945 MRN [id number]   Location [facility] 4W/SW/SE Attending Aidan Santacruz MD   Hosp Day # 10 PCP None Pcp       Subjective:   Getting respirat Payal Pulido. Marcio Mc MD  12/24/2019    Assessment and Plan:    This is a 66year old with recently diagnosed SCLC, HTN, admitted with SOB and LE edema:     MEAGHAN:  - Etiology likely related to diuretic use ( 12/18,20,21), compounded by cisplatin(12/19)

## 2019-12-24 NOTE — PROGRESS NOTES
[facility] Patient Status:  Inpatient    9/15/1945 MRN [id number]   Location [facility] 4W/SW/SE Attending Rafy Giron MD   Hosp Day # 10 PCP None Pcp     Pulm / Critical Care Progress Note     S: didn't get much re wall: No tenderness or deformity. Heart: Regular rate and rhythm, normal S1S2, no murmur. Abdomen: soft, non-tender, non-distended, positive BS.    Extremity: +edema         Recent Labs   Lab 12/21/19  0459 12/23/19  0346 12/24/19  0545   WBC 22.1* 18.4

## 2019-12-24 NOTE — PLAN OF CARE
Problem: Patient Centered Care  Goal: Patient preferences are identified and integrated in the patient's plan of care  Description  Interventions:  - What would you like us to know as we care for you? I haven't been to a doctor in a long time.   I like to support as indicated  - Manage/alleviate anxiety  - Monitor for signs/symptoms of CO2 retention  Outcome: Progressing     Problem: COPING  Goal: Pt/Family able to verbalize concerns and demonstrate effective coping strategies  Description  INTERVENTIONS: behaviors that affect risk of falls.   - [place] fall precautions as indicated by assessment.  - Educate pt/family on patient safety including physical limitations  - Instruct pt to call for assistance with activity based on assessment  - Modify environme

## 2019-12-24 NOTE — CM/SW NOTE
Per nursing rounds pt will need home O2. HME form placed on chart.    Will need MD signature and O2 sats as documented below in a nursing note prior to dc.     --------Bedside RN copy and Paste below documentation into a new note-------    Walk/Test/Clinic

## 2019-12-24 NOTE — PROGRESS NOTES
[facility] Pharmacy Note:  Renal Dose Adjustment for Enoxaparin (LOVENOX)    Chet Landin has been prescribed Enoxaparin (LOVENOX) 40 mg subcutaneously every 24 hours. Estimated Creatinine Clearance: 26.5 mL/min (A) (based on SCr of 1.43 mg/dL (H)).     Her

## 2019-12-24 NOTE — PROGRESS NOTES
[facility] Hospitalist Progress Note     CC: Hospital Follow up    PCP: None Pcp       Assessment/Plan:     Principal Problem:    Lung mass  Active Problems:    Swelling of lower extremity    Hypoxia    Elis Torrez 66year old female with PMH sig- unknown (ha pressure 110/82, pulse 108, temperature 98.9 °F (37.2 °C), temperature source Axillary, resp. rate 18, height 5' 2\" (1.575 m), weight 166 lb 12.8 oz (75.7 kg), SpO2 93 %.     Temp:  [97.3 °F (36.3 °C)-98.9 °F (37.2 °C)] 98.9 °F (37.2 °C)  Pulse:  [] CO2 33.0* 34.0* 33.0*       Recent Labs   Lab 12/19/19  0639 12/24/19  0529   ALT 35  --    AST 19  --    ALB 2.9* 1.8*         Imaging:  Xr Chest Ap Portable  (cpt=71045)    Result Date: 12/23/2019  CONCLUSION:  1.  Slight worsening of the consolidation/ IVPB, bisacodyl, Normal Saline Flush, Normal Saline Flush, acetaminophen **OR** [DISCONTINUED] HYDROcodone-acetaminophen **OR** [DISCONTINUED] HYDROcodone-acetaminophen, ondansetron HCl, influenza virus vaccine PF, hydrALAzine HCl

## 2019-12-24 NOTE — OCCUPATIONAL THERAPY NOTE
OCCUPATIONAL THERAPY EVALUATION - INPATIENT     Room Number: 463/463-A  Evaluation Date: 12/24/2019  Type of Evaluation: Initial       Physician Order: IP Consult to Occupational Therapy  Reason for Therapy: ADL/IADL Dysfunction and Discharge Planning    O history.     Past Surgical History  Past Surgical History:   Procedure Laterality Date   • APPENDECTOMY     • BRONCHOSCOPY N/A 12/17/2019    Performed by Merlyn Nugent MD at [facility] ENDOSCOPY   • ENDOBRONCHIAL ULTRASOUND (EBUS) N/A 12/17/2019    Performed Sit : Supervision  Sit to Stand: Supervision  Toilet Transfer: SBA   Shower Transfer: NT  Chair Transfer: SBA   Bedroom Mobility: SBA     FUNCTIONAL ADL ASSESSMENT  Grooming: Setup  Feeding: Setup  Toileting: CGA-SBA   Upper Extremity Dressing: Lori Santos

## 2019-12-24 NOTE — PHYSICAL THERAPY NOTE
PHYSICAL THERAPY EVALUATION - INPATIENT     Room Number: 463/463-A  Evaluation Date: 12/24/2019  Type of Evaluation: Initial   Physician Order: PT Eval and Treat    Presenting Problem: lung mass new dx   Reason for Therapy: Mobility Dysfunction and Discha training  Rehab Potential : Good  Frequency (Obs): 5x/week       PHYSICAL THERAPY MEDICAL/SOCIAL HISTORY   Problem List  Principal Problem:    Lung mass  Active Problems:    Swelling of lower extremity    Hypoxia      Past Medical History  History reviewed Moving from lying on back to sitting on the side of the bed?: A Little   How much help from another person does the patient currently need. ..   -   Moving to and from a bed to a chair (including a wheelchair)?: A Little   -   Need to walk in hospital room?

## 2019-12-24 NOTE — PLAN OF CARE
Pt complaining of abdominal pain, no meds given. VSS on 5 L HFNC. Suppository given, pt then had 2 small bowel movements. When up to the bathroom, pt becomes tachycardic and more short of breath, neb treatment given. Up with SBA and a walker.  Sleeping betw ABGs  - Provide Smoking Cessation handout, if applicable  - Encourage broncho-pulmonary hygiene including cough, deep breathe, Incentive Spirometry  - Assess the need for suctioning and perform as needed  - Assess and instruct to report SOB or any respirat psychosocial needs and initiate Spiritual Care or Behavioral Health consult as needed  Outcome: Progressing     Problem: SAFETY ADULT - FALL  Goal: Free from fall injury  Description  INTERVENTIONS:  - Assess pt frequently for physical needs  - Identify co

## 2019-12-25 LAB
ALBUMIN SERPL-MCNC: 1.6 G/DL (ref 3.4–5)
ANION GAP SERPL CALC-SCNC: 4 MMOL/L (ref 0–18)
BASOPHILS # BLD: 0 X10(3) UL (ref 0–0.2)
BASOPHILS NFR BLD: 0 %
BUN BLD-MCNC: 40 MG/DL (ref 7–18)
BUN/CREAT SERPL: 30.3 (ref 10–20)
CALCIUM BLD-MCNC: 8.1 MG/DL (ref 8.5–10.1)
CHLORIDE SERPL-SCNC: 102 MMOL/L (ref 98–112)
CO2 SERPL-SCNC: 34 MMOL/L (ref 21–32)
CREAT BLD-MCNC: 1.32 MG/DL (ref 0.55–1.02)
DEPRECATED RDW RBC AUTO: 51.7 FL (ref 35.1–46.3)
EOSINOPHIL # BLD: 0 X10(3) UL (ref 0–0.7)
EOSINOPHIL NFR BLD: 0 %
ERYTHROCYTE [DISTWIDTH] IN BLOOD BY AUTOMATED COUNT: 14.8 % (ref 11–15)
GLUCOSE BLD-MCNC: 107 MG/DL (ref 70–99)
HAV IGM SER QL: 2.5 MG/DL (ref 1.6–2.6)
HCT VFR BLD AUTO: 35.7 % (ref 35–48)
HGB BLD-MCNC: 11.4 G/DL (ref 12–16)
LYMPHOCYTES NFR BLD: 0.26 X10(3) UL (ref 1–4)
LYMPHOCYTES NFR BLD: 8 %
MCH RBC QN AUTO: 30 PG (ref 26–34)
MCHC RBC AUTO-ENTMCNC: 31.9 G/DL (ref 31–37)
MCV RBC AUTO: 93.9 FL (ref 80–100)
MONOCYTES # BLD: 0 X10(3) UL (ref 0.1–1)
MONOCYTES NFR BLD: 0 %
MORPHOLOGY: NORMAL
NEUTROPHILS # BLD AUTO: 2.44 X10 (3) UL (ref 1.5–7.7)
NEUTROPHILS NFR BLD: 91 %
NEUTS BAND NFR BLD: 1 %
NEUTS HYPERSEG # BLD: 3.04 X10(3) UL (ref 1.5–7.7)
OSMOLALITY SERPL CALC.SUM OF ELEC: 300 MOSM/KG (ref 275–295)
PHOSPHATE SERPL-MCNC: 2.9 MG/DL (ref 2.5–4.9)
PLATELET # BLD AUTO: 59 10(3)UL (ref 150–450)
PLATELET MORPHOLOGY: NORMAL
POTASSIUM SERPL-SCNC: 3 MMOL/L (ref 3.5–5.1)
POTASSIUM SERPL-SCNC: 3.8 MMOL/L (ref 3.5–5.1)
RBC # BLD AUTO: 3.8 X10(6)UL (ref 3.8–5.3)
SODIUM SERPL-SCNC: 140 MMOL/L (ref 136–145)
TOTAL CELLS COUNTED: 100
VANCOMYCIN TROUGH SERPL-MCNC: 13.7 UG/ML (ref 10–20)
WBC # BLD AUTO: 3.3 X10(3) UL (ref 4–11)

## 2019-12-25 RX ORDER — POTASSIUM CHLORIDE 20 MEQ/1
40 TABLET, EXTENDED RELEASE ORAL ONCE
Status: COMPLETED | OUTPATIENT
Start: 2019-12-25 | End: 2019-12-25

## 2019-12-25 RX ORDER — POTASSIUM CHLORIDE 14.9 MG/ML
20 INJECTION INTRAVENOUS ONCE
Status: COMPLETED | OUTPATIENT
Start: 2019-12-25 | End: 2019-12-25

## 2019-12-25 NOTE — PROGRESS NOTES
Pulmonary Progress Note        NAME: Gretta Mclean - ROOM: 463/463-A - MRN: [id number] - Age: 66year old - : 9/15/1945    History reviewed. No pertinent past medical history.       SUBJECTIVE: No new events overnight,     OBJECTIVE:    Oxygen Therapy < > 15.0 14.6 14.8   NEPRELIM 12.31*  --   --  5.69 2.44   WBC 13.6*   < > 18.4* 7.0 3.3*   .0   < > 148.0* 98.0* 59.0*    < > = values in this interval not displayed.          Recent Labs   Lab 12/23/19  0346 12/24/19  0529 12/25/19  0457

## 2019-12-25 NOTE — PROGRESS NOTES
[facility] dosing service    Follow-up Pharmacokinetic Consult for Vancomycin Dosing     Jamil Simon is a 66year old female who is being treated for pneumonia. Patient is on day 3 of Vancomycin and is currently receiving 1.25 gm IV Q 24 hours.   Go

## 2019-12-25 NOTE — PROGRESS NOTES
[facility] Hospitalist Progress Note     CC: Hospital Follow up    PCP: None Pcp     Assessment/Plan:     Principal Problem:    Lung mass  Active Problems:    Swelling of lower extremity    Hypoxia    Guero Boateng 66year old female with PMH sig- unknown (hasn tired but not able to sleep overnight. OBJECTIVE:    Blood pressure 145/67, pulse 98, temperature 97.9 °F (36.6 °C), temperature source Oral, resp. rate 18, height 5' 2\" (1.575 m), weight 166 lb 8 oz (75.5 kg), SpO2 95 %.     Temp:  [97.9 °F (36.6 °C)- 1.61* 1.43* 1.32*   GFRAA 36* 41* 45*   GFRNAA 31* 36* 39*   CA 9.0 8.3* 8.1*   * 139 140   K 4.2 3.1* 3.0*   CL 96* 101 102   CO2 34.0* 33.0* 34.0*       Recent Labs   Lab 12/19/19  0639 12/24/19  0529 12/25/19  0457   ALT 35  --   --    AST 19  --

## 2019-12-25 NOTE — PLAN OF CARE
Pt denies pain. VSS on 5 L high flow NC. Had 2 bowel movements overnight. Scheduled zosyn given. Up to bathroom with SBA and walker. Resting comfortably between cares, call light within reach.      Problem: Patient Centered Care  Goal: Patient preferences a including cough, deep breathe, Incentive Spirometry  - Assess the need for suctioning and perform as needed  - Assess and instruct to report SOB or any respiratory difficulty  - Respiratory Therapy support as indicated  - Manage/alleviate anxiety  - [place] Progressing     Problem: SAFETY ADULT - FALL  Goal: Free from fall injury  Description  INTERVENTIONS:  - Assess pt frequently for physical needs  - Identify cognitive and physical deficits and behaviors that affect risk of falls.   - [place] fall precaut

## 2019-12-25 NOTE — PLAN OF CARE
Problem: Patient Centered Care  Goal: Patient preferences are identified and integrated in the patient's plan of care  Description  Interventions:  - What would you like us to know as we care for you? I haven't been to a doctor in a long time.   I like to support as indicated  - Manage/alleviate anxiety  - Monitor for signs/symptoms of CO2 retention  Outcome: Progressing     Problem: CARDIOVASCULAR - ADULT  Goal: Maintains optimal cardiac output and hemodynamic stability  Description  INTERVENTIONS:  - Joy behaviors that affect risk of falls.   - [place] fall precautions as indicated by assessment.  - Educate pt/family on patient safety including physical limitations  - Instruct pt to call for assistance with activity based on assessment  - Modify environme

## 2019-12-25 NOTE — PROGRESS NOTES
[facility]    Progress Note    June Cabral Patient Status:  Inpatient    9/15/1945 MRN [id number]   Location [facility] 4W/SW/SE Attending Natalia Luis MD   Hosp Day # 11 PCP None Pcp       Subjective:   Sitting comforta BNP elevated at 900  - Albumin low at 2.9  - Will restart diuretics soon     HTN:  - Now hypotensive will hold amlodipine at this time.     Hypokalemia:  - Replete as per protocol.     Thank you for allowing me to participate in the care of your patient.

## 2019-12-26 LAB
ALBUMIN SERPL-MCNC: 1.6 G/DL (ref 3.4–5)
ANION GAP SERPL CALC-SCNC: 3 MMOL/L (ref 0–18)
BASOPHILS # BLD: 0 X10(3) UL (ref 0–0.2)
BASOPHILS NFR BLD: 0 %
BUN BLD-MCNC: 36 MG/DL (ref 7–18)
BUN/CREAT SERPL: 29 (ref 10–20)
CALCIUM BLD-MCNC: 8.1 MG/DL (ref 8.5–10.1)
CHLORIDE SERPL-SCNC: 103 MMOL/L (ref 98–112)
CO2 SERPL-SCNC: 32 MMOL/L (ref 21–32)
CREAT BLD-MCNC: 1.24 MG/DL (ref 0.55–1.02)
DEPRECATED RDW RBC AUTO: 52.6 FL (ref 35.1–46.3)
EOSINOPHIL # BLD: 0 X10(3) UL (ref 0–0.7)
EOSINOPHIL NFR BLD: 0 %
ERYTHROCYTE [DISTWIDTH] IN BLOOD BY AUTOMATED COUNT: 14.6 % (ref 11–15)
GLUCOSE BLD-MCNC: 110 MG/DL (ref 70–99)
HAV IGM SER QL: 2.2 MG/DL (ref 1.6–2.6)
HCT VFR BLD AUTO: 34.6 % (ref 35–48)
HGB BLD-MCNC: 11.1 G/DL (ref 12–16)
LYMPHOCYTES NFR BLD: 0.33 X10(3) UL (ref 1–4)
LYMPHOCYTES NFR BLD: 47 %
MCH RBC QN AUTO: 31 PG (ref 26–34)
MCHC RBC AUTO-ENTMCNC: 32.1 G/DL (ref 31–37)
MCV RBC AUTO: 96.6 FL (ref 80–100)
MONOCYTES # BLD: 0.01 X10(3) UL (ref 0.1–1)
MONOCYTES NFR BLD: 2 %
MORPHOLOGY: NORMAL
NEUTROPHILS NFR BLD: 51 %
NEUTS HYPERSEG # BLD: 0.36 X10(3) UL (ref 1.5–7.7)
OSMOLALITY SERPL CALC.SUM OF ELEC: 295 MOSM/KG (ref 275–295)
PHOSPHATE SERPL-MCNC: 2.2 MG/DL (ref 2.5–4.9)
PLATELET # BLD AUTO: 32 10(3)UL (ref 150–450)
PLATELET MORPHOLOGY: NORMAL
POTASSIUM SERPL-SCNC: 3.5 MMOL/L (ref 3.5–5.1)
RBC # BLD AUTO: 3.58 X10(6)UL (ref 3.8–5.3)
SODIUM SERPL-SCNC: 138 MMOL/L (ref 136–145)
TOTAL CELLS COUNTED: 100
WBC # BLD AUTO: 0.7 X10(3) UL (ref 4–11)

## 2019-12-26 RX ORDER — FUROSEMIDE 10 MG/ML
20 INJECTION INTRAMUSCULAR; INTRAVENOUS
Status: DISCONTINUED | OUTPATIENT
Start: 2019-12-26 | End: 2019-12-27

## 2019-12-26 RX ORDER — POTASSIUM CHLORIDE 20 MEQ/1
40 TABLET, EXTENDED RELEASE ORAL EVERY 4 HOURS
Status: DISCONTINUED | OUTPATIENT
Start: 2019-12-26 | End: 2019-12-26

## 2019-12-26 NOTE — PROGRESS NOTES
Pulmonary Progress Note     Assessment / Plan:  1. Acute hypoxemic resp failure - due to lung mass, AECOPD, and RUL pneumonia   - wean O2 as able  - bd protocol  2. Pneumonia - right upper lobe consolidation on CXR. Likely postobstructive.  Sputum culture w

## 2019-12-26 NOTE — PLAN OF CARE
Problem: Patient Centered Care  Goal: Patient preferences are identified and integrated in the patient's plan of care  Description  Interventions:  - What would you like us to know as we care for you? I haven't been to a doctor in a long time.   I like to support as indicated  - Manage/alleviate anxiety  - Monitor for signs/symptoms of CO2 retention  Outcome: Progressing     Problem: COPING  Goal: Pt/Family able to verbalize concerns and demonstrate effective coping strategies  Description  INTERVENTIONS: behaviors that affect risk of falls.   - [place] fall precautions as indicated by assessment.  - Educate pt/family on patient safety including physical limitations  - Instruct pt to call for assistance with activity based on assessment  - Modify environme

## 2019-12-26 NOTE — PROGRESS NOTES
[facility] Hospitalist Progress Note     CC: Hospital Follow up    PCP: None Pcp     Assessment/Plan:     Principal Problem:    Lung mass  Active Problems:    Swelling of lower extremity    Hypoxia    Charmaine Fernández 66year old female with PMH sig- unknown (hasn lb 8 oz (75.5 kg)  12/24/19 0357 : 166 lb 12.8 oz (75.7 kg)  12/23/19 0507 : 163 lb 9.6 oz (74.2 kg)  12/22/19 0632 : 168 lb (76.2 kg)  12/21/19 0530 : 171 lb 11.2 oz (77.9 kg)  12/20/19 1311 : 174 lb 6.1 oz (79.1 kg)  12/20/19 1210 : 178 lb 4.8 oz (80.9 k mg Oral Daily with breakfast   • docusate sodium  100 mg Oral BID   • PEG 3350  17 g Oral Daily   • allopurinol  300 mg Oral BID   • Fluticasone Furoate-Vilanterol  1 puff Inhalation Daily   • ipratropium-albuterol  3 mL Nebulization Q6H WA   • nicotine  1

## 2019-12-26 NOTE — PHYSICAL THERAPY NOTE
PHYSICAL THERAPY TREATMENT NOTE - INPATIENT     Room Number: 463/463-A       Presenting Problem: lung mass new dx     Problem List  Principal Problem:    Lung mass  Active Problems:    Swelling of lower extremity    Hypoxia      PHYSICAL THERAPY ASSESSMENT Static Sitting: Good  Dynamic Sitting: Good           Static Standing: Fair -  Dynamic Standing: Fair -    ACTIVITY TOLERANCE  Pre-activity, supine:  SPO2 98% on 5L NC walker - rolling      Goal #2  Current Status Pt performs transfers with rolling walker at [facility]   Goal #3 Patient is able to ambulate 100 feet with assist device: walker - rolling at assistance level: supervision   Goal #3   Current Status Pt ambulates 130

## 2019-12-26 NOTE — PLAN OF CARE
No acute events overnight. No complaints of pain. Remains on 5LO2. Productive cough. IV abx given. Upx1 with walker, voiding freely. MD notified of critical lab values. Neutropenic precautions initiated. Will continue to monitor.      Problem: Patient Andry Encourage broncho-pulmonary hygiene including cough, deep breathe, Incentive Spirometry  - Assess the need for suctioning and perform as needed  - Assess and instruct to report SOB or any respiratory difficulty  - Respiratory Therapy support as indicated replacement therapy as ordered  Outcome: Progressing     Problem: SAFETY ADULT - FALL  Goal: Free from fall injury  Description  INTERVENTIONS:  - Assess pt frequently for physical needs  - Identify cognitive and physical deficits and behaviors that affect

## 2019-12-26 NOTE — PROGRESS NOTES
[facility]    Progress Note    Gurmeet Tavares Patient Status:  Inpatient    9/15/1945 MRN [id number]   Location [facility] 4W/SW/SE Attending Wendie Candelaria MD   Hosp Day # 12 PCP None Pcp       Subjective:   Sitting comforta elevated at 900  - Albumin low at 2.9  - Lasix 20mg IV BID - Will monitor renal function and diuretic response.     HTN:  - Now on laisx 20mg IV BID    Hypokalemia:  - Replete as per protocol.     Discussed with Dr. Stevens Halsted     Thank you for allowing me to p

## 2019-12-27 LAB
ALBUMIN SERPL-MCNC: 1.5 G/DL (ref 3.4–5)
ANION GAP SERPL CALC-SCNC: 7 MMOL/L (ref 0–18)
BUN BLD-MCNC: 37 MG/DL (ref 7–18)
BUN/CREAT SERPL: 29.8 (ref 10–20)
CALCIUM BLD-MCNC: 7.9 MG/DL (ref 8.5–10.1)
CHLORIDE SERPL-SCNC: 100 MMOL/L (ref 98–112)
CO2 SERPL-SCNC: 32 MMOL/L (ref 21–32)
CREAT BLD-MCNC: 1.24 MG/DL (ref 0.55–1.02)
GLUCOSE BLD-MCNC: 114 MG/DL (ref 70–99)
HAV IGM SER QL: 1.9 MG/DL (ref 1.6–2.6)
OSMOLALITY SERPL CALC.SUM OF ELEC: 298 MOSM/KG (ref 275–295)
PHOSPHATE SERPL-MCNC: 2 MG/DL (ref 2.5–4.9)
POTASSIUM SERPL-SCNC: 2.8 MMOL/L (ref 3.5–5.1)
POTASSIUM SERPL-SCNC: 3.4 MMOL/L (ref 3.5–5.1)
SODIUM SERPL-SCNC: 139 MMOL/L (ref 136–145)

## 2019-12-27 RX ORDER — POTASSIUM CHLORIDE 14.9 MG/ML
20 INJECTION INTRAVENOUS ONCE
Status: COMPLETED | OUTPATIENT
Start: 2019-12-27 | End: 2019-12-27

## 2019-12-27 RX ORDER — IPRATROPIUM BROMIDE AND ALBUTEROL SULFATE 2.5; .5 MG/3ML; MG/3ML
3 SOLUTION RESPIRATORY (INHALATION)
Status: DISCONTINUED | OUTPATIENT
Start: 2019-12-27 | End: 2019-12-30

## 2019-12-27 RX ORDER — FUROSEMIDE 10 MG/ML
20 INJECTION INTRAMUSCULAR; INTRAVENOUS DAILY
Status: DISCONTINUED | OUTPATIENT
Start: 2019-12-28 | End: 2019-12-30

## 2019-12-27 NOTE — PHYSICAL THERAPY NOTE
Attempted to see pt for PT treatment; pt was resting in bed, refused any OOB activity at this time due to feeling very fatigued and more SOB today.  Encouraged pt ambulate a little bit and to sit up in the chair to ease breathing but she refused at this ryan

## 2019-12-27 NOTE — CM/SW NOTE
PT/OT recommending HHC. SW spoke w/ pt who stated she will think about and is uncertain if she wants to decide at this time. Pt currently on 4LO2, no home O2. HME order form will need MD signature if O2 is needed closer to pts's discharge.     Docum

## 2019-12-27 NOTE — DIETARY NOTE
ADULT NUTRITION UPDATE NOTE     NUTRITION DIAGNOSIS/PROBLEM:  Inadequate oral intake related to decreased ability to consume sufficient energy as evidenced by no appetite for the past 2-3 days (recent chemo tx possible etiology).   NUTRITION DIAGNOSIS PRO 2.0*   OSMOCALC 300*  --  295 298*       NUTRITION PRESCRIPTION:  Diet: Regular/General  Oral Supplements: chocolate ensure enlive tid.   Estimated Nutritional Needs:  Calories: 9835-0335 calories/day (23-25 calories per kg Current wt)  Protein: 75 grams pr

## 2019-12-27 NOTE — PLAN OF CARE
Problem: Patient Centered Care  Goal: Patient preferences are identified and integrated in the patient's plan of care  Description  Interventions:  - What would you like us to know as we care for you? I haven't been to a doctor in a long time.   I like to support as indicated  - Manage/alleviate anxiety  - Monitor for signs/symptoms of CO2 retention  Outcome: Progressing     Problem: COPING  Goal: Pt/Family able to verbalize concerns and demonstrate effective coping strategies  Description  INTERVENTIONS: behaviors that affect risk of falls.   - [place] fall precautions as indicated by assessment.  - Educate pt/family on patient safety including physical limitations  - Instruct pt to call for assistance with activity based on assessment  - Modify environme

## 2019-12-27 NOTE — PROGRESS NOTES
[facility] Hospitalist Progress Note     CC: Hospital Follow up    PCP: None Pcp     Assessment/Plan:     Principal Problem:    Lung mass  Active Problems:    Swelling of lower extremity    Hypoxia    Laura Luz 66year old female with PMH sig- unknown (hasn Weights  12/25/19 0516 : 166 lb 8 oz (75.5 kg)  12/24/19 0357 : 166 lb 12.8 oz (75.7 kg)  12/23/19 0507 : 163 lb 9.6 oz (74.2 kg)  12/22/19 0632 : 168 lb (76.2 kg)  12/21/19 0530 : 171 lb 11.2 oz (77.9 kg)  12/20/19 1311 : 174 lb 6.1 oz (79.1 kg)  12/20/19 vancomycin HCl  125 mg Oral Daily   • predniSONE  40 mg Oral Daily with breakfast   • docusate sodium  100 mg Oral BID   • PEG 3350  17 g Oral Daily   • allopurinol  300 mg Oral BID   • Fluticasone Furoate-Vilanterol  1 puff Inhalation Daily   • nicotine

## 2019-12-27 NOTE — PROGRESS NOTES
[facility]    Progress Note    Jewell Allen Patient Status:  Inpatient    9/15/1945 MRN [id number]   Location [facility] 4W/SW/SE Attending Jacob Kayser, MD   Hosp Day # 13 PCP None Pcp       Subjective:   Sitting comforta will decrease to lasix 20mg IV QD at this time.     HTN:  - Now on laisx 20mg IV QD    Hypokalemia:  - Replete as per protocol.       Thank you for allowing me to participate in the care of your patient.  Heath [place].  [facility]  12/23/2019

## 2019-12-27 NOTE — PROGRESS NOTES
Pulmonary Progress Note     Assessment / Plan:  1. Acute hypoxemic resp failure - due to lung mass, AECOPD, and RUL pneumonia   - wean O2 as able, may have a chronic requirement  - bd protocol  - add flutter valve  2.  Pneumonia - right upper lobe consolida

## 2019-12-27 NOTE — PROGRESS NOTES
[facility] Pharmacy Note:  Renal Adjustment for piperacillin/tazobactam (Amber Zambrano)    Cate Maguire is a 66year old female who has been prescribed piperacillin/tazobactam (ZOSYN) 3375 mg every 12 hrs.   CrCl is estimated creatinine clearance is 30.5 mL/min (A) (ba

## 2019-12-28 LAB
ALBUMIN SERPL-MCNC: 1.6 G/DL (ref 3.4–5)
ANION GAP SERPL CALC-SCNC: 7 MMOL/L (ref 0–18)
ANTIBODY SCREEN: NEGATIVE
BASOPHILS # BLD: 0 X10(3) UL (ref 0–0.2)
BASOPHILS NFR BLD: 0 %
BUN BLD-MCNC: 30 MG/DL (ref 7–18)
BUN/CREAT SERPL: 22.2 (ref 10–20)
CALCIUM BLD-MCNC: 8 MG/DL (ref 8.5–10.1)
CHLORIDE SERPL-SCNC: 100 MMOL/L (ref 98–112)
CO2 SERPL-SCNC: 32 MMOL/L (ref 21–32)
CREAT BLD-MCNC: 1.35 MG/DL (ref 0.55–1.02)
DEPRECATED RDW RBC AUTO: 49.4 FL (ref 35.1–46.3)
EOSINOPHIL # BLD: 0 X10(3) UL (ref 0–0.7)
EOSINOPHIL NFR BLD: 0 %
ERYTHROCYTE [DISTWIDTH] IN BLOOD BY AUTOMATED COUNT: 14 % (ref 11–15)
GLUCOSE BLD-MCNC: 112 MG/DL (ref 70–99)
HAV IGM SER QL: 1.5 MG/DL (ref 1.6–2.6)
HCT VFR BLD AUTO: 35.8 % (ref 35–48)
HGB BLD-MCNC: 11.4 G/DL (ref 12–16)
LYMPHOCYTES NFR BLD: 0.2 X10(3) UL (ref 1–4)
LYMPHOCYTES NFR BLD: 98 %
MCH RBC QN AUTO: 30.2 PG (ref 26–34)
MCHC RBC AUTO-ENTMCNC: 31.8 G/DL (ref 31–37)
MCV RBC AUTO: 95 FL (ref 80–100)
MONOCYTES # BLD: 0 X10(3) UL (ref 0.1–1)
MONOCYTES NFR BLD: 1 %
MORPHOLOGY: NORMAL
NEUTROPHILS NFR BLD: 0 %
NEUTS BAND NFR BLD: 1 %
NEUTS HYPERSEG # BLD: 0 X10(3) UL (ref 1.5–7.7)
OSMOLALITY SERPL CALC.SUM OF ELEC: 295 MOSM/KG (ref 275–295)
PHOSPHATE SERPL-MCNC: 1.6 MG/DL (ref 2.5–4.9)
PLATELET # BLD AUTO: 5 10(3)UL (ref 150–450)
PLATELET MORPHOLOGY: NORMAL
POTASSIUM SERPL-SCNC: 3.3 MMOL/L (ref 3.5–5.1)
RBC # BLD AUTO: 3.77 X10(6)UL (ref 3.8–5.3)
RH BLOOD TYPE: POSITIVE
SODIUM SERPL-SCNC: 139 MMOL/L (ref 136–145)
TOTAL CELLS COUNTED: 100
TOXIC GRANULES BLD QL SMEAR: PRESENT
WBC # BLD AUTO: 0.2 X10(3) UL (ref 4–11)

## 2019-12-28 PROCEDURE — 30233R1 TRANSFUSION OF NONAUTOLOGOUS PLATELETS INTO PERIPHERAL VEIN, PERCUTANEOUS APPROACH: ICD-10-PCS | Performed by: INTERNAL MEDICINE

## 2019-12-28 RX ORDER — MAGNESIUM OXIDE 400 MG (241.3 MG MAGNESIUM) TABLET
800 TABLET ONCE
Status: COMPLETED | OUTPATIENT
Start: 2019-12-28 | End: 2019-12-28

## 2019-12-28 RX ORDER — PREDNISONE 10 MG/1
30 TABLET ORAL
Status: DISCONTINUED | OUTPATIENT
Start: 2019-12-29 | End: 2019-12-30

## 2019-12-28 RX ORDER — SODIUM CHLORIDE 9 MG/ML
INJECTION, SOLUTION INTRAVENOUS ONCE
Status: COMPLETED | OUTPATIENT
Start: 2019-12-28 | End: 2019-12-28

## 2019-12-28 NOTE — PLAN OF CARE
Problem: Patient Centered Care  Goal: Patient preferences are identified and integrated in the patient's plan of care  Description  Interventions:  - What would you like us to know as we care for you? I haven't been to a doctor in a long time.   I like to support as indicated  - Manage/alleviate anxiety  - Monitor for signs/symptoms of CO2 retention  Outcome: Progressing     Problem: COPING  Goal: Pt/Family able to verbalize concerns and demonstrate effective coping strategies  Description  INTERVENTIONS: behaviors that affect risk of falls.   - [place] fall precautions as indicated by assessment.  - Educate pt/family on patient safety including physical limitations  - Instruct pt to call for assistance with activity based on assessment  - Modify environme

## 2019-12-28 NOTE — PROGRESS NOTES
[facility] Patient Status:  Inpatient    9/15/1945 MRN [id number]   Location [facility] 4W/SW/SE Attending Herbert Molina MD   Hosp Day # 14 PCP None Pcp     SUBJECTIVE: Pt denies complaints today.        OBJECTIVE:  /6 Daily  •  Normal Saline Flush 0.9 % injection 3 mL, 3 mL, Intravenous, PRN  •  acetaminophen (TYLENOL) tab 650 mg, 650 mg, Oral, Q4H PRN **OR** [DISCONTINUED] HYDROcodone-acetaminophen (NORCO) 5-325 MG per tab 1 tablet, 1 tablet, Oral, Q4H PRN **OR** [DISC radiology interpretation except where noted. ASSESSMENT/PLAN:  1. Acute hypoxemic resp failure - due to lung mass, AECOPD, and RUL pneumonia   - wean O2 as able, may have a chronic requirement  - bd protocol  - add flutter valve  2.  Pneumonia - right

## 2019-12-28 NOTE — PROGRESS NOTES
[facility]    Nephrology Progress Note    Korina Groves Attending:  Fede Valadez MD       SUBJECTIVE:     Stable sob  Notes robust UOP response to lasix    PHYSICAL EXAM:     Vital Signs: /53 (BP Location: Right arm)   Pulse 98   Te 12/28/2019    BASO 0 12/28/2019    NEPERCENT 90.6 12/20/2019    LYPERCENT 3.5 12/20/2019    MOPERCENT 4.6 12/20/2019    EOPERCENT 0.0 12/20/2019    BAPERCENT 0.1 12/20/2019    NE 12.31 (H) 12/20/2019    LYMABS 0.48 (L) 12/20/2019    MOABSO 0.62 12/20/2019 Daily  allopurinol (ZYLOPRIM) tab 300 mg, 300 mg, Oral, BID  Normal Saline Flush 0.9 % injection 10 mL, 10 mL, Intravenous, PRN  Fluticasone Furoate-Vilanterol (BREO ELLIPTA) 200-25 MCG/INH inhaler 1 puff, 1 puff, Inhalation, Daily  Normal Saline Flush 0.9

## 2019-12-28 NOTE — PLAN OF CARE
Vanesa Concepcion is aware of POC at this time. Pt remains on 3.5 L O2. O2 walk will need to be done prior to d/c. She get SOB with exertion. Potassium replaced per protocol.        Problem: Patient Centered Care  Goal: Patient preferences are identified and int breathe, Incentive Spirometry  - Assess the need for suctioning and perform as needed  - Assess and instruct to report SOB or any respiratory difficulty  - Respiratory Therapy support as indicated  - Manage/alleviate anxiety  - Monitor for signs/symptoms o Problem: SAFETY ADULT - FALL  Goal: Free from fall injury  Description  INTERVENTIONS:  - Assess pt frequently for physical needs  - Identify cognitive and physical deficits and behaviors that affect risk of falls.   - [place] fall precautions as indica

## 2019-12-28 NOTE — PROGRESS NOTES
[facility] Hospitalist Progress Note     CC: Hospital Follow up    PCP: None Pcp     Assessment/Plan:     Principal Problem:    Lung mass  Active Problems:    Swelling of lower extremity    Hypoxia    Chris Lager 66year old female with PMH sig- unknown (hasn Last 3 Weights  12/28/19 0700 : 162 lb 4.8 oz (73.6 kg)  12/25/19 0516 : 166 lb 8 oz (75.5 kg)  12/24/19 0357 : 166 lb 12.8 oz (75.7 kg)  12/23/19 0507 : 163 lb 9.6 oz (74.2 kg)  12/22/19 0632 : 168 lb (76.2 kg)  12/21/19 0530 : 171 lb 11.2 oz (77.9 ipratropium-albuterol  3 mL Nebulization 6 times per day   • furosemide  20 mg Intravenous Daily   • vancomycin HCl  125 mg Oral Daily   • docusate sodium  100 mg Oral BID   • PEG 3350  17 g Oral Daily   • allopurinol  300 mg Oral BID   • Fluticasone Furoa

## 2019-12-28 NOTE — PLAN OF CARE
Pt is alert and oriented. Neutropenic precautions maintained. 3.5L high flow nc, scheduled nebs. Tylenol given for pain management. Tele in place. Tolerating diet. Abx. Call light within reach.      Problem: Patient Centered Care  Goal: Patient preferences including cough, deep breathe, Incentive Spirometry  - Assess the need for suctioning and perform as needed  - Assess and instruct to report SOB or any respiratory difficulty  - Respiratory Therapy support as indicated  - Manage/alleviate anxiety  - [place] Progressing     Problem: SAFETY ADULT - FALL  Goal: Free from fall injury  Description  INTERVENTIONS:  - Assess pt frequently for physical needs  - Identify cognitive and physical deficits and behaviors that affect risk of falls.   - [place] fall precaut

## 2019-12-29 ENCOUNTER — APPOINTMENT (OUTPATIENT)
Dept: CT IMAGING | Facility: HOSPITAL | Age: 74
DRG: 177 | End: 2019-12-29
Attending: INTERNAL MEDICINE
Payer: MEDICARE

## 2019-12-29 ENCOUNTER — APPOINTMENT (OUTPATIENT)
Dept: GENERAL RADIOLOGY | Facility: HOSPITAL | Age: 74
DRG: 177 | End: 2019-12-29
Attending: HOSPITALIST
Payer: MEDICARE

## 2019-12-29 LAB
ALBUMIN SERPL-MCNC: 1.5 G/DL (ref 3.4–5)
ANION GAP SERPL CALC-SCNC: 10 MMOL/L (ref 0–18)
BASOPHILS # BLD: 0 X10(3) UL (ref 0–0.2)
BASOPHILS NFR BLD: 0 %
BLOOD TYPE BARCODE: 6200
BUN BLD-MCNC: 28 MG/DL (ref 7–18)
BUN/CREAT SERPL: 25.5 (ref 10–20)
CALCIUM BLD-MCNC: 7.9 MG/DL (ref 8.5–10.1)
CHLORIDE SERPL-SCNC: 98 MMOL/L (ref 98–112)
CO2 SERPL-SCNC: 29 MMOL/L (ref 21–32)
CREAT BLD-MCNC: 1.1 MG/DL (ref 0.55–1.02)
DEPRECATED RDW RBC AUTO: 47 FL (ref 35.1–46.3)
EOSINOPHIL # BLD: 0 X10(3) UL (ref 0–0.7)
EOSINOPHIL NFR BLD: 0 %
ERYTHROCYTE [DISTWIDTH] IN BLOOD BY AUTOMATED COUNT: 13.6 % (ref 11–15)
GLUCOSE BLD-MCNC: 102 MG/DL (ref 70–99)
HAV IGM SER QL: 1.5 MG/DL (ref 1.6–2.6)
HCT VFR BLD AUTO: 29.9 % (ref 35–48)
HGB BLD-MCNC: 9.9 G/DL (ref 12–16)
LYMPHOCYTES NFR BLD: 0.28 X10(3) UL (ref 1–4)
LYMPHOCYTES NFR BLD: 94 %
MCH RBC QN AUTO: 31.1 PG (ref 26–34)
MCHC RBC AUTO-ENTMCNC: 33.1 G/DL (ref 31–37)
MCV RBC AUTO: 94 FL (ref 80–100)
MONOCYTES # BLD: 0.02 X10(3) UL (ref 0.1–1)
MONOCYTES NFR BLD: 5 %
MORPHOLOGY: NORMAL
NEUTROPHILS # BLD AUTO: 0.01 X10 (3) UL (ref 1.5–7.7)
NEUTROPHILS NFR BLD: 1 %
NEUTS HYPERSEG # BLD: 0 X10(3) UL (ref 1.5–7.7)
OSMOLALITY SERPL CALC.SUM OF ELEC: 290 MOSM/KG (ref 275–295)
PHOSPHATE SERPL-MCNC: 1.6 MG/DL (ref 2.5–4.9)
PHOSPHATE SERPL-MCNC: 3.9 MG/DL (ref 2.5–4.9)
PLATELET # BLD AUTO: 44 10(3)UL (ref 150–450)
PLATELET MORPHOLOGY: NORMAL
POTASSIUM SERPL-SCNC: 2.9 MMOL/L (ref 3.5–5.1)
POTASSIUM SERPL-SCNC: 3 MMOL/L (ref 3.5–5.1)
POTASSIUM SERPL-SCNC: 3 MMOL/L (ref 3.5–5.1)
RBC # BLD AUTO: 3.18 X10(6)UL (ref 3.8–5.3)
SODIUM SERPL-SCNC: 137 MMOL/L (ref 136–145)
TOTAL CELLS COUNTED: 100
WBC # BLD AUTO: 0.3 X10(3) UL (ref 4–11)

## 2019-12-29 PROCEDURE — 71250 CT THORAX DX C-: CPT | Performed by: INTERNAL MEDICINE

## 2019-12-29 PROCEDURE — 71045 X-RAY EXAM CHEST 1 VIEW: CPT | Performed by: HOSPITALIST

## 2019-12-29 RX ORDER — VANCOMYCIN HYDROCHLORIDE
1500 ONCE
Status: COMPLETED | OUTPATIENT
Start: 2019-12-29 | End: 2019-12-29

## 2019-12-29 RX ORDER — VANCOMYCIN HYDROCHLORIDE
1250 EVERY 24 HOURS
Status: DISCONTINUED | OUTPATIENT
Start: 2019-12-30 | End: 2020-01-03

## 2019-12-29 RX ORDER — POTASSIUM CHLORIDE 14.9 MG/ML
20 INJECTION INTRAVENOUS ONCE
Status: COMPLETED | OUTPATIENT
Start: 2019-12-29 | End: 2019-12-29

## 2019-12-29 NOTE — PLAN OF CARE
Problem: Patient Centered Care  Goal: Patient preferences are identified and integrated in the patient's plan of care  Description  Interventions:  - What would you like us to know as we care for you? I haven't been to a doctor in a long time.   I like to support as indicated  - Manage/alleviate anxiety  - Monitor for signs/symptoms of CO2 retention  Outcome: Progressing     Problem: COPING  Goal: Pt/Family able to verbalize concerns and demonstrate effective coping strategies  Description  INTERVENTIONS: behaviors that affect risk of falls.   - [place] fall precautions as indicated by assessment.  - Educate pt/family on patient safety including physical limitations  - Instruct pt to call for assistance with activity based on assessment  - Modify environme Dr. Gillie Robertson called with CXR results. Results relayed to day shift RN.

## 2019-12-29 NOTE — PROGRESS NOTES
[facility] Dosing Service    Initial Pharmacokinetic Consult for Vancomycin Dosing     Paz Hunt is a 66year old female who is being treated for MRSA infection /HCAP.   Pharmacy has been asked to dose Vancomycin by Dr. Joao Maddox MD.    She has N

## 2019-12-29 NOTE — PROGRESS NOTES
Report given to Ernesto Brooks, PCU RN. Pt transferred to PCU, family at bedside and aware of transfer to 209.

## 2019-12-29 NOTE — PROGRESS NOTES
[facility] Patient Status:  Inpatient    9/15/1945 MRN [id number]   Location [facility] 4W/SW/SE Attending Shellie Crigler, MD   Hosp Day # 15 PCP None Pcp     SUBJECTIVE: Pt with increasing oxygen requirements overnight.   Rep mg, 125 mg, Oral, Daily  •  Metoclopramide HCl (REGLAN) injection 5 mg, 5 mg, Intravenous, Q8H PRN  •  ipratropium-albuterol (DUONEB) nebulizer solution 3 mL, 3 mL, Nebulization, Q4H PRN  •  ondansetron HCl (ZOFRAN) 8 mg in sodium chloride 0.9% 104 mL IVPB + edema                          Skin: No rashes or lesions.      Lab Results   Component Value Date    WBC 0.3 12/29/2019    RBC 3.18 12/29/2019    HGB 9.9 12/29/2019    HCT 29.9 12/29/2019    MCV 94.0 12/29/2019    MCH 31.1 12/29/2019    MCHC 33.1 12/29/2 are in agreement with proceeding with bronchoscopy if deemed necessary    Estefanía Arias MD

## 2019-12-29 NOTE — PROGRESS NOTES
[facility] Hospitalist Progress Note     CC: Hospital Follow up    PCP: None Pcp     Assessment/Plan:     Principal Problem:    Lung mass  Active Problems:    Swelling of lower extremity    Hypoxia    Yoshi Goldstein 66year old female with PMH sig- unknown (hasn °C)  Pulse:  [] 105  Resp:  [18-24] 24  BP: (114-134)/(50-63) 133/63      Intake/Output:    Intake/Output Summary (Last 24 hours) at 12/29/2019 1158  Last data filed at 12/29/2019 0616  Gross per 24 hour   Intake 1457.08 ml   Output 1 ml   Net 1456. 0 12/27/19  0531 12/28/19  0522 12/29/19  0525   ALB 1.6* 1.6* 1.5* 1.6* 1.5*         Imaging:  Xr Chest Ap Portable  (cpt=71045)    Result Date: 12/29/2019  CONCLUSION:   Interval development of cavitation within the right upper lobe.   Findings may be secon

## 2019-12-29 NOTE — PLAN OF CARE
Problem: Patient Centered Care  Goal: Patient preferences are identified and integrated in the patient's plan of care  Description  Interventions:  - What would you like us to know as we care for you? I haven't been to a doctor in a long time.   I like to indicated by assessment.  - Educate pt/family on patient safety including physical limitations  - Instruct pt to call for assistance with activity based on assessment  - Modify environment to reduce risk of injury  - Provide assistive devices as appropriat Progressing     Problem: RESPIRATORY - ADULT  Goal: Achieves optimal ventilation and oxygenation  Description  INTERVENTIONS:  - Assess for changes in respiratory status  - Assess for changes in mentation and behavior  - Position to facilitate oxygenation

## 2019-12-29 NOTE — PROGRESS NOTES
[facility]    Nephrology Progress Note    Aubrey November Attending:  Concetta Davis MD       SUBJECTIVE:     Had some nausea and general malaise today  Has increasing O2 requirements  No cramping or urinary complaints  CXR with RUL cavitary 0.28 (L) 12/29/2019    EOSABS 0.00 12/29/2019    BASABS 0.00 12/29/2019    NEUT 1 12/29/2019    LYMPH 94 12/29/2019    MON 5 12/29/2019    EOS 0 12/29/2019    BASO 0 12/29/2019    NEPERCENT 90.6 12/20/2019    LYPERCENT 3.5 12/20/2019    MOPERCENT 4.6 12/20 times per day  furosemide (LASIX) injection 20 mg, 20 mg, Intravenous, Daily  vancomycin HCl (FIRVANQ) 50 MG/ML oral solution 125 mg, 125 mg, Oral, Daily  Metoclopramide HCl (REGLAN) injection 5 mg, 5 mg, Intravenous, Q8H PRN  ipratropium-albuterol (Amee Lynda CT chest pending  -- per oncology    Thank you for allowing me to participate in the care of this patient. Please do not hesitate to call with questions or concerns.         Simba Duong MD  [address] Nephrology  [address] [place] [address], [address]

## 2019-12-30 LAB
ALBUMIN SERPL-MCNC: 1.3 G/DL (ref 3.4–5)
ANION GAP SERPL CALC-SCNC: 2 MMOL/L (ref 0–18)
BASOPHILS # BLD: 0 X10(3) UL (ref 0–0.2)
BASOPHILS NFR BLD: 0 %
BUN BLD-MCNC: 24 MG/DL (ref 7–18)
BUN/CREAT SERPL: 18.8 (ref 10–20)
CALCIUM BLD-MCNC: 7.7 MG/DL (ref 8.5–10.1)
CHLORIDE SERPL-SCNC: 100 MMOL/L (ref 98–112)
CO2 SERPL-SCNC: 34 MMOL/L (ref 21–32)
CREAT BLD-MCNC: 1.28 MG/DL (ref 0.55–1.02)
DEPRECATED RDW RBC AUTO: 47.4 FL (ref 35.1–46.3)
DOHLE BOD BLD QL SMEAR: PRESENT
EOSINOPHIL # BLD: 0.01 X10(3) UL (ref 0–0.7)
EOSINOPHIL NFR BLD: 2 %
ERYTHROCYTE [DISTWIDTH] IN BLOOD BY AUTOMATED COUNT: 13.8 % (ref 11–15)
GLUCOSE BLD-MCNC: 112 MG/DL (ref 70–99)
HAV IGM SER QL: 2.2 MG/DL (ref 1.6–2.6)
HCT VFR BLD AUTO: 29.3 % (ref 35–48)
HEPARIN AB PPP QL PL AGG: NEGATIVE
HGB BLD-MCNC: 9.4 G/DL (ref 12–16)
LYMPHOCYTES NFR BLD: 0.31 X10(3) UL (ref 1–4)
LYMPHOCYTES NFR BLD: 52 %
M TB CMPLX RRNA SPEC QL PROBE: NOT DETECTED
MCH RBC QN AUTO: 29.9 PG (ref 26–34)
MCHC RBC AUTO-ENTMCNC: 32.1 G/DL (ref 31–37)
MCV RBC AUTO: 93.3 FL (ref 80–100)
MONOCYTES # BLD: 0 X10(3) UL (ref 0.1–1)
MONOCYTES NFR BLD: 0 %
MORPHOLOGY: NORMAL
MYELOCYTES # BLD: 0.01 X10(3) UL
MYELOCYTES NFR BLD: 2 %
NEUTROPHILS NFR BLD: 20 %
NEUTS BAND NFR BLD: 24 %
NEUTS HYPERSEG # BLD: 0.26 X10(3) UL (ref 1.5–7.7)
OSMOLALITY SERPL CALC.SUM OF ELEC: 287 MOSM/KG (ref 275–295)
PHOSPHATE SERPL-MCNC: 2.5 MG/DL (ref 2.5–4.9)
PLATELET # BLD AUTO: 28 10(3)UL (ref 150–450)
PLATELET MORPHOLOGY: NORMAL
POTASSIUM SERPL-SCNC: 2.7 MMOL/L (ref 3.5–5.1)
POTASSIUM SERPL-SCNC: 2.9 MMOL/L (ref 3.5–5.1)
POTASSIUM SERPL-SCNC: 2.9 MMOL/L (ref 3.5–5.1)
RBC # BLD AUTO: 3.14 X10(6)UL (ref 3.8–5.3)
SODIUM SERPL-SCNC: 136 MMOL/L (ref 136–145)
TOTAL CELLS COUNTED: 50
WBC # BLD AUTO: 0.6 X10(3) UL (ref 4–11)

## 2019-12-30 RX ORDER — POTASSIUM CHLORIDE 14.9 MG/ML
20 INJECTION INTRAVENOUS ONCE
Status: COMPLETED | OUTPATIENT
Start: 2019-12-30 | End: 2019-12-30

## 2019-12-30 RX ORDER — POTASSIUM CHLORIDE 1.5 G/1.77G
40 POWDER, FOR SOLUTION ORAL EVERY 4 HOURS
Status: DISPENSED | OUTPATIENT
Start: 2019-12-30 | End: 2019-12-31

## 2019-12-30 RX ORDER — IPRATROPIUM BROMIDE AND ALBUTEROL SULFATE 2.5; .5 MG/3ML; MG/3ML
3 SOLUTION RESPIRATORY (INHALATION)
Status: DISCONTINUED | OUTPATIENT
Start: 2019-12-30 | End: 2020-01-02

## 2019-12-30 RX ORDER — POTASSIUM CHLORIDE 20 MEQ/1
40 TABLET, EXTENDED RELEASE ORAL EVERY 4 HOURS
Status: DISCONTINUED | OUTPATIENT
Start: 2019-12-30 | End: 2019-12-30

## 2019-12-30 NOTE — PROGRESS NOTES
Critical Care Progress Note     Assessment / Plan:  1.  Acute hypoxemic respiratory failure - due to lung mass and postobstructive pneumonia that has now developed into a large cavitary lesion (malignancy that has cavitated is possible, but it has developed visualized all relevant chest imaging in PACS and agree with radiology interpretation except where noted.

## 2019-12-30 NOTE — PLAN OF CARE
Problem: Patient Centered Care  Goal: Patient preferences are identified and integrated in the patient's plan of care  Description  Interventions:  - What would you like us to know as we care for you? I haven't been to a doctor in a long time.   I like to Maintains optimal cardiac output and hemodynamic stability  Description  INTERVENTIONS:  - Monitor vital signs, rhythm, and trends  - Monitor for bleeding, hypotension and signs of decreased cardiac output  - Evaluate effectiveness of vasoactive medication appropriate  - Identify discharge learning needs (meds, wound care, etc)  - Arrange for interpreters to assist at discharge as needed  - Consider post-discharge preferences of patient/family/discharge partner  - Complete POLST form as appropriate  - Assess

## 2019-12-30 NOTE — PROGRESS NOTES
[facility] Hospitalist Progress Note     CC: Hospital Follow up    PCP: None Pcp     Assessment/Plan:     Principal Problem:    Lung mass  Active Problems:    Swelling of lower extremity    Hypoxia    Ash Crain 66year old female with PMH sig- unknown (hasn [] 100  Resp:  [15-26] 24  BP: (103-133)/(50-89) 122/51      Intake/Output:    Intake/Output Summary (Last 24 hours) at 12/30/2019 0923  Last data filed at 12/29/2019 2201  Gross per 24 hour   Intake 2815.75 ml   Output 125 ml   Net 2690.75 ml 100 98  --  100   CO2 32.0 29.0  --  34.0*       Recent Labs   Lab 12/26/19  0513 12/27/19  0531 12/28/19  0522 12/29/19  0525 12/30/19  0443   ALB 1.6* 1.5* 1.6* 1.5* 1.3*         Imaging:  Ct Chest (cpt=71250)    Result Date: 12/29/2019  CONCLUSION:  1. Oral Daily   • allopurinol  300 mg Oral BID   • Fluticasone Furoate-Vilanterol  1 puff Inhalation Daily   • nicotine  1 patch Transdermal Daily       Metoclopramide HCl, ipratropium-albuterol, ondansetron (ZOFRAN) IVPB, bisacodyl, Normal Saline Flush, Norm

## 2019-12-30 NOTE — CONSULTS
[facility] Infectious Disease  Report of Consultation    Perla Mariscal Patient Status:  Inpatient    9/15/1945 MRN [id number]   Location [facility] 2W/ Attending Sofia Carter MD   Hosp Day # 15 PCP None Pcp     Date of Admission Intravenous, Q12H **FOLLOWED BY** [START ON 12/30/2019] voriconazole (VFEND) 200 mg in sodium chloride 0.9% 100 mL IVPB, 4 mg/kg (Ideal), Intravenous, Q12H  •  [START ON 12/30/2019] vancomycin IVPB premix 1.25g in 0.9% NaCl 250 mL, 1,250 mg, Intravenous, Q MG/24HR 1 patch, 1 patch, Transdermal, Daily  •  hydrALAzine HCl (APRESOLINE) injection 10 mg, 10 mg, Intravenous, Q6H PRN    Review of Systems:    Constitutional:  No fevers, chills, diaphoresis, weight changes.    HEENT:  No visual changes, oral ulcers, s normal.   Throat:  Oropharynx clear, MMs moist.   Neck: Trachea ML, no masses. Lungs: Coarse rhonchi b/l. Chest wall: No tenderness or deformity. Heart: Regular rate and rhythm, normal S1S2, no murmurs. Abdomen: Soft, NT/ND. Bowel sounds present. with SVC syndrome  - s/p first round of chemotherapy 12/19-12/21    3. Leukopenia  - Drug induced  - Per heme    4. Disposition - inpatient. Agree with broad spectrum antibiotics.   Cavitation could represent necrotic tumor or it could represent an evolv

## 2019-12-30 NOTE — PROGRESS NOTES
[facility]  Progress Note    Linda Zendejas Patient Status:  Inpatient    9/15/1945 MRN [id number]   Location [facility] 4W/SW/SE Attending Tracey Chatman MD   Hosp Day # 16 PCP None Pcp     Subjective:  Linda Zendejas is a(n) 66 yea lobe obscuring the previously seen pulmonary malignancy -main considerations bacterial , fungal and TB.   2. Metastatic mediastinal and hilar lymphadenopathy not well evaluated without contrast.  3. Small right greater than left pleural effusions with compr

## 2019-12-30 NOTE — PROGRESS NOTES
Therapeutic interchange from FILGRASTIM 480 MCG/0.8ML IJ SOSY to FILGRASTIM-SNDZ 480 MCG/0.8ML IJ SOSY per P&T approved protocol.      Ida LongD

## 2019-12-30 NOTE — PROGRESS NOTES
32 [facility] Infectious Disease  Progress Note    Paz Hunt Patient Status:  Inpatient    9/15/1945 MRN [id number]   Location [facility] 2W/SW Attending Joao Maddox MD   Hosp Day # 16 PCP None Pcp     Subjective:  Patient seen/ex pleural effusions with compressive atelectasis. 4. 4.7 cm aneurysmal dilatation of ascending aorta. 5. Multivessel coronary artery calcification. 6. Calcified gallstone. 7. Left thyroid nodule.     Assessment and Plan:     1.   Worsening pulmonary statu

## 2019-12-30 NOTE — CONSULTS
Thoracic Surgery Consult    Reason for Consultation: Ricardo [place] Obstructive PNA    Consulting Physician: Alma    Chief Complaint: \"I have an infection\"    History of Present Illness: Patient is a 66year old, female presented to ED on 12/14/19 with 2 we Pulse 100   Temp 97.3 °F (36.3 °C) (Temporal)   Resp 24   Ht 5' 2\" (1.575 m)   Wt 162 lb 4.8 oz (73.6 kg)   SpO2 96%   BMI 29.69 kg/m²     General: laying in bed, O2 NC, NAD  HEENT: Normocephalic  Neck: Supple, trachea midline, no JVD, no masses.   Heart: and provide recommendations    Yan Duarte PA-C  Thoracic Surgery  Pager: [phone]

## 2019-12-30 NOTE — PLAN OF CARE
Problem: Patient Centered Care  Goal: Patient preferences are identified and integrated in the patient's plan of care  Description  Interventions:  - What would you like us to know as we care for you? I haven't been to a doctor in a long time.   I like to spiritual values  - Provide emotional support, including active listening and acknowledgement of concerns of patient and caregivers  - Reduce environmental stimuli, as able  - Instruct patient/family in relaxation techniques, as appropriate  - Assess for s to assist with strengthening/mobility  - Encourage toileting schedule  Outcome: Progressing     Problem: DISCHARGE PLANNING  Goal: Discharge to home or other facility with appropriate resources  Description  INTERVENTIONS:  - Identify barriers to discharge

## 2019-12-30 NOTE — PROGRESS NOTES
[facility]    Nephrology Progress Note    Sujata Hart Attending:  Kaykay Gardiner MD       SUBJECTIVE:     Has increasing O2 requirements  Po intake poor, loose stools  No other complaints    PHYSICAL EXAM:     Vital Signs: /58 (BP L 12/30/2019    MON 0 12/30/2019    EOS 2 12/30/2019    BASO 0 12/30/2019    NEPERCENT 90.6 12/20/2019    LYPERCENT 3.5 12/20/2019    MOPERCENT 4.6 12/20/2019    EOPERCENT 0.0 12/20/2019    BAPERCENT 0.1 12/20/2019    NE 12.31 (H) 12/20/2019    LYMABS 0.48 ( Daily  allopurinol (ZYLOPRIM) tab 300 mg, 300 mg, Oral, BID  Normal Saline Flush 0.9 % injection 10 mL, 10 mL, Intravenous, PRN  Fluticasone Furoate-Vilanterol (BREO ELLIPTA) 200-25 MCG/INH inhaler 1 puff, 1 puff, Inhalation, Daily  Normal Saline Flush 0.9 Nephrology  [facility]  29 [facility]  Parmjit, [address]

## 2019-12-31 LAB
ALBUMIN SERPL-MCNC: 1.3 G/DL (ref 3.4–5)
ANION GAP SERPL CALC-SCNC: 6 MMOL/L (ref 0–18)
ASPERGILLUS GALACTOMANNAN AG: NEGATIVE
ASPERGILLUS GALACTOMANNAN AG: NEGATIVE
ASPERGILLUS GALACTOMANNAN INDX: 0.04
ASPERGILLUS GALACTOMANNAN INDX: 0.05
BUN BLD-MCNC: 25 MG/DL (ref 7–18)
BUN/CREAT SERPL: 18 (ref 10–20)
CALCIUM BLD-MCNC: 8.1 MG/DL (ref 8.5–10.1)
CHLORIDE SERPL-SCNC: 100 MMOL/L (ref 98–112)
CO2 SERPL-SCNC: 34 MMOL/L (ref 21–32)
CREAT BLD-MCNC: 1.39 MG/DL (ref 0.55–1.02)
CREAT UR-SCNC: 61.3 MG/DL
GLUCOSE BLD-MCNC: 87 MG/DL (ref 70–99)
HAV IGM SER QL: 2 MG/DL (ref 1.6–2.6)
OSMOLALITY SERPL CALC.SUM OF ELEC: 294 MOSM/KG (ref 275–295)
PHOSPHATE SERPL-MCNC: 2 MG/DL (ref 2.5–4.9)
POTASSIUM SERPL-SCNC: 3.2 MMOL/L (ref 3.5–5.1)
POTASSIUM SERPL-SCNC: 3.2 MMOL/L (ref 3.5–5.1)
SODIUM SERPL-SCNC: 140 MMOL/L (ref 136–145)
SODIUM SERPL-SCNC: 48 MMOL/L
UUN UR-MCNC: 515 MG/DL
VANCOMYCIN TROUGH SERPL-MCNC: 14.1 UG/ML (ref 10–20)

## 2019-12-31 RX ORDER — POTASSIUM CHLORIDE 14.9 MG/ML
20 INJECTION INTRAVENOUS ONCE
Status: COMPLETED | OUTPATIENT
Start: 2019-12-31 | End: 2019-12-31

## 2019-12-31 NOTE — PROGRESS NOTES
Critical Care Progress Note     Assessment / Plan:  1.  Acute hypoxemic respiratory failure - due to lung mass and postobstructive pneumonia that has now developed into a large cavitary lesion (malignancy that has cavitated is possible, but it has developed - interactive, answering questions appropriately    Medications:  Reviewed in EMR    Lab Data:  Reviewed in EMR    Imaging:  I independently visualized all relevant chest imaging in PACS and agree with radiology interpretation except where noted.

## 2019-12-31 NOTE — IMAGING NOTE
Requested Paz Sanchez RN to keep NPO after Midnight tomorrow for chest tube insertion on Thursday. Patient is not on any blood thinners according to RN.

## 2019-12-31 NOTE — PHYSICAL THERAPY NOTE
PHYSICAL THERAPY TREATMENT NOTE - INPATIENT     Room Number: 638/245-M       Presenting Problem: lung mass new dx     Problem List  Principal Problem:    Lung mass  Active Problems:    Swelling of lower extremity    Hypoxia      PHYSICAL THERAPY ASSESSMENT mechanics; Endurance; Patient education; Energy conservation;Gait training;Stair training;Transfer training    SUBJECTIVE  Pt was agreeable to therapy. OBJECTIVE  Precautions:  Other (Comment)(Airborne)    WEIGHT BEARING RESTRICTION  Weight Bearing Restrict recommendations with /(PCT present in room)    CURRENT GOALS   Goals to be met by: 1/10/20  Patient Goal Patient's self-stated goal is: to go home   Goal #1 Patient is able to demonstrate supine - sit EOB @ level: supervision

## 2019-12-31 NOTE — OCCUPATIONAL THERAPY NOTE
OCCUPATIONAL THERAPY TREATMENT NOTE - INPATIENT        Room Number: 117/248-T                Problem List  Principal Problem:    Lung mass  Active Problems:    Swelling of lower extremity    Hypoxia      OCCUPATIONAL THERAPY ASSESSMENT     RN cleared pt fo rate  Location: discomfort on back        ACTIVITY TOLERANCE  8LO2   90%    ACTIVITIES OF DAILY LIVING ASSESSMENT  AM-PAC ‘6-Clicks’ Inpatient Daily Activity Short Form  How much help from another person does the patient currently need…  -   Putting on and

## 2019-12-31 NOTE — DIETARY NOTE
ADULT NUTRITION UPDATE NOTE      RECOMMENDATIONS TO MD: Recommend: Initiate temporary enteral nutrition until po intake is improved.    NUTRITION DIAGNOSIS/PROBLEM:  Inadequate oral intake related to decreased ability to consume sufficient energy as evide ANTHROPOMETRICS:  HT: 157.5 cm (5' 2\")  WT: 78.5 kg (173 lb) --up from adm wt of 163#, pt with  LE edema,     Usual Body Wt: pt unsure, but she doesn't think she has lost any wt recent. Gain a few yrs back when quit smoking.       FOOD/NUTRITION RELATE

## 2019-12-31 NOTE — PROGRESS NOTES
32 [facility] Infectious Disease  Progress Note    Jewell Allen Patient Status:  Inpatient    9/15/1945 MRN [id number]   Location [facility] 2W/SW Attending Caleb Vidal MD   Hosp Day # 17 PCP None Pcp     Subjective:  Patient seen/ex Left thyroid nodule.     Assessment and Plan:     1.  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

## 2019-12-31 NOTE — PROGRESS NOTES
[facility] CLINICS  Progress Note    Paz Hunt Patient Status:  Inpatient    9/15/1945 MRN [id number]   Location [facility] 4W/SW/SE Attending Rabia Gamez,  [facility] Se Day # 17 PCP None Pcp     Subjective:  Paz Hunt is a(n) 66 yea hilar lymphadenopathy not well evaluated without contrast.  3. Small right greater than left pleural effusions with compressive atelectasis. 4. 4.7 cm aneurysmal dilatation of ascending aorta. 5. Multivessel coronary artery calcification.   6. Calcified g

## 2019-12-31 NOTE — PLAN OF CARE
Problem: Patient Centered Care  Goal: Patient preferences are identified and integrated in the patient's plan of care  Description  Interventions:  - What would you like us to know as we care for you? I haven't been to a doctor in a long time.   I like to indicated  - Manage/alleviate anxiety  - Monitor for signs/symptoms of CO2 retention  Outcome: Not Progressing     Problem: CARDIOVASCULAR - ADULT  Goal: Maintains optimal cardiac output and hemodynamic stability  Description  INTERVENTIONS:  - Monitor vit and behaviors that affect risk of falls.   - [place] fall precautions as indicated by assessment.  - Educate pt/family on patient safety including physical limitations  - Instruct pt to call for assistance with activity based on assessment  - Modify envir

## 2019-12-31 NOTE — PROGRESS NOTES
Thoracic surgery recommending pigtail chest tube for drainage for cavitary lung lesion. Discussed with IR and reviewed with patient at length. Risks and benefits explained and patient is agreeable. Plan to proceed on Thursday.  Hopefully off isolation at th

## 2020-01-01 LAB
ALBUMIN SERPL-MCNC: 1.4 G/DL (ref 3.4–5)
ANION GAP SERPL CALC-SCNC: 6 MMOL/L (ref 0–18)
BUN BLD-MCNC: 27 MG/DL (ref 7–18)
BUN/CREAT SERPL: 18.6 (ref 10–20)
CALCIUM BLD-MCNC: 8 MG/DL (ref 8.5–10.1)
CHLORIDE SERPL-SCNC: 100 MMOL/L (ref 98–112)
CO2 SERPL-SCNC: 32 MMOL/L (ref 21–32)
CREAT BLD-MCNC: 1.45 MG/DL (ref 0.55–1.02)
CREAT UR-SCNC: 55.7 MG/DL
GLUCOSE BLD-MCNC: 100 MG/DL (ref 70–99)
HAV IGM SER QL: 1.8 MG/DL (ref 1.6–2.6)
OSMOLALITY SERPL CALC.SUM OF ELEC: 291 MOSM/KG (ref 275–295)
PHOSPHATE SERPL-MCNC: 1.9 MG/DL (ref 2.5–4.9)
POTASSIUM SERPL-SCNC: 3.4 MMOL/L (ref 3.5–5.1)
SODIUM SERPL-SCNC: 138 MMOL/L (ref 136–145)
SODIUM SERPL-SCNC: 57 MMOL/L
UUN UR-MCNC: 579 MG/DL

## 2020-01-01 RX ORDER — ACYCLOVIR 200 MG/1
200 CAPSULE ORAL
Status: DISPENSED | OUTPATIENT
Start: 2020-01-01 | End: 2020-01-03

## 2020-01-01 RX ORDER — VALACYCLOVIR HYDROCHLORIDE 500 MG/1
2000 TABLET, FILM COATED ORAL EVERY 12 HOURS SCHEDULED
Status: DISCONTINUED | OUTPATIENT
Start: 2020-01-01 | End: 2020-01-01 | Stop reason: ALTCHOICE

## 2020-01-01 RX ORDER — SODIUM CHLORIDE 9 MG/ML
INJECTION, SOLUTION INTRAVENOUS CONTINUOUS
Status: DISCONTINUED | OUTPATIENT
Start: 2020-01-01 | End: 2020-01-03

## 2020-01-01 RX ORDER — MAGNESIUM SULFATE HEPTAHYDRATE 40 MG/ML
2 INJECTION, SOLUTION INTRAVENOUS ONCE
Status: COMPLETED | OUTPATIENT
Start: 2020-01-01 | End: 2020-01-01

## 2020-01-01 NOTE — PROGRESS NOTES
[facility]    Progress Note    Perla Mariscal Patient Status:  Inpatient    9/15/1945 MRN [id number]   Location [facility] 2W/SW Attending Sofia Carter, [address] [facility] Se Day # 18 PCP None Pcp       Subjective:   Perla Mariscal  is a 66 Daily   • nicotine  1 patch Transdermal Daily       Continuous Infusions:   • sodium chloride           Results:     Lab Results   Component Value Date    WBC 0.6 (LL) 12/30/2019    HGB 9.4 (L) 12/30/2019    HCT 29.3 (L) 12/30/2019    PLT 28.0 (L) 12/30/20

## 2020-01-01 NOTE — PLAN OF CARE
Problem: Patient Centered Care  Goal: Patient preferences are identified and integrated in the patient's plan of care  Description  Interventions:  - What would you like us to know as we care for you? I haven't been to a doctor in a long time.   I like to Manage/alleviate anxiety  - Monitor for signs/symptoms of CO2 retention  Outcome: Progressing     Problem: CARDIOVASCULAR - ADULT  Goal: Maintains optimal cardiac output and hemodynamic stability  Description  INTERVENTIONS:  - Monitor vital signs, rhythm, of falls.   - [place] fall precautions as indicated by assessment.  - Educate pt/family on patient safety including physical limitations  - Instruct pt to call for assistance with activity based on assessment  - Modify environment to reduce risk of injury

## 2020-01-01 NOTE — PROGRESS NOTES
32 [facility] Infectious Disease  Progress Note    Yoshi Goldstein Patient Status:  Inpatient    9/15/1945 MRN [id number]   Location [facility] 2W/SW Attending Jennifer Goodson MD   Hosp Day # 18 PCP None Pcp     Subjective:  Patient seen/ex Calcified gallstone.   7. Left thyroid nodule.     Assessment and Plan:     1.  Worsening pulmonary status in this immunocompromised patient s/p initiation of chemotherapy for recently diagnosed SSLC  - Patient with SVC syndrome, known RUL/hilar mass  - Rep

## 2020-01-01 NOTE — PROGRESS NOTES
Valtrex (valacyclovir) 1000mg PO BID x 2 doses has been substituted with Zovirax (acyclovir) 200mg po 5 x per day x 2 days for herpetic lesion of the lip. Valtrex (valacyclovir) is not on the [address] [place] [address] formulary.     Luna Baer, Brandy   [address]

## 2020-01-01 NOTE — PLAN OF CARE
Patient seems depressed. Alert and talking appropriately. Assisted up to chair this morning using the lift and able to work with PT/OT this afternoon to walk to chair and back to bed.  States she has a decreased appetite and was encouraged to drink at least RESPIRATORY - ADULT  Goal: Achieves optimal ventilation and oxygenation  Description  INTERVENTIONS:  - Assess for changes in respiratory status  - Assess for changes in mentation and behavior  - Position to facilitate oxygenation and minimize respiratory Progressing  Goal: Absence of cardiac arrhythmias or at baseline  Description  INTERVENTIONS:  - Continuous cardiac monitoring, monitor vital signs, obtain 12 lead EKG if indicated  - Evaluate effectiveness of antiarrhythmic and heart rate control medicati Progressing

## 2020-01-01 NOTE — PROGRESS NOTES
[facility] Patient Status:  Inpatient    9/15/1945 MRN [id number]   Location [facility] 2W/SW Attending Ozzy Tsai MD   [facility] Day # 25 PCP None Pcp     Critical Care Progress Note     Date of Admission: 2019  5:18 P inhaler 1 puff, 1 puff, Inhalation, Daily  •  Normal Saline Flush 0.9 % injection 3 mL, 3 mL, Intravenous, PRN  •  acetaminophen (TYLENOL) tab 650 mg, 650 mg, Oral, Q4H PRN **OR** [DISCONTINUED] HYDROcodone-acetaminophen (NORCO) 5-325 MG per tab 1 tablet, 01/01/2020    BUN 27 01/01/2020    CREATSERUM 1.45 01/01/2020     01/01/2020    CA 8.0 01/01/2020    ALB 1.4 01/01/2020     Lab Results   Component Value Date    INR 0.94 12/16/2019           Imaging: I have independently visualized all relevant shaggy

## 2020-01-01 NOTE — PLAN OF CARE
Patient stable today. Up to chair using walker and able to sit > 2 hours. Incontinent episodes of stool and care provided. Sensicare and aloe vesta cream applied to reddened fermin areas. Patient on specialty mattress. Oxygen stable at 8L High flow.  Sputum s if applicable  - Encourage broncho-pulmonary hygiene including cough, deep breathe, Incentive Spirometry  - Assess the need for suctioning and perform as needed  - Assess and instruct to report SOB or any respiratory difficulty  - Respiratory Therapy suppo assist with strengthening/mobility  - Encourage toileting schedule  Outcome: Progressing     Problem: Patient/Family Goals  Goal: Patient/Family Long Term Goal  Description  Patient's Long Term Goal: \"find out what's causing the problems and go home as so system  Outcome: Not Progressing

## 2020-01-01 NOTE — PROGRESS NOTES
[facility]    Nephrology Progress Note    Chandu Cabral Attending:  Geoff Saldaña MD       SUBJECTIVE:     Still on high o2  Po intake poor,    No other complaints    PHYSICAL EXAM:     Vital Signs: /52 (BP Location: Right arm)   Pul BASO 0 12/30/2019    NEPERCENT 90.6 12/20/2019    LYPERCENT 3.5 12/20/2019    MOPERCENT 4.6 12/20/2019    EOPERCENT 0.0 12/20/2019    BAPERCENT 0.1 12/20/2019    NE 12.31 (H) 12/20/2019    LYMABS 0.48 (L) 12/20/2019    MOABSO 0.62 12/20/2019    EOABSO 0.00 Intravenous, PRN  Fluticasone Furoate-Vilanterol (BREO ELLIPTA) 200-25 MCG/INH inhaler 1 puff, 1 puff, Inhalation, Daily  Normal Saline Flush 0.9 % injection 3 mL, 3 mL, Intravenous, PRN  acetaminophen (TYLENOL) tab 650 mg, 650 mg, Oral, Q4H PRN  ondansetr [address] [address], [address]

## 2020-01-01 NOTE — PROGRESS NOTES
[facility] Hospitalist Progress Note     CC: Hospital Follow up    PCP: None Pcp     Assessment/Plan:     Principal Problem:    Lung mass  Active Problems:    Swelling of lower extremity    Hypoxia    Danica Cramer 66year old female with PMH sig- unknown (hasn 24  BP: (107-132)/(47-64) 125/47      Intake/Output:    Intake/Output Summary (Last 24 hours) at 1/1/2020 0929  Last data filed at 1/1/2020 0844  Gross per 24 hour   Intake 3696 ml   Output 1120 ml   Net 2576 ml       Last 3 Weights  01/01/20 0500 : 165 lb 12/31/19  0439 01/01/20  0513   ALB 1.6* 1.5* 1.3* 1.3* 1.4*         Imaging:  Ct Chest (cpt=71250)    Result Date: 12/29/2019  CONCLUSION:  1.  Necrotizing cavitary pneumonia in the right upper lobe obscuring the previously seen pulmonary malignancy -main

## 2020-01-02 ENCOUNTER — APPOINTMENT (OUTPATIENT)
Dept: CT IMAGING | Facility: HOSPITAL | Age: 75
DRG: 177 | End: 2020-01-02
Attending: RADIOLOGY
Payer: MEDICARE

## 2020-01-02 LAB
ALBUMIN SERPL-MCNC: 1.3 G/DL (ref 3.4–5)
ANION GAP SERPL CALC-SCNC: 5 MMOL/L (ref 0–18)
BASOPHILS # BLD: 0 X10(3) UL (ref 0–0.2)
BASOPHILS # BLD: 0.12 X10(3) UL (ref 0–0.2)
BASOPHILS NFR BLD: 0 %
BASOPHILS NFR BLD: 1 %
BLASTOMYCES ANTIBODY BY EIA, SER: 0.1 IV
BLASTS # BLD: 0.37 X10(3) UL
BLASTS NFR BLD: 3 %
BUN BLD-MCNC: 29 MG/DL (ref 7–18)
BUN/CREAT SERPL: 19.7 (ref 10–20)
C DIFF TOX B STL QL: NEGATIVE
CALCIUM BLD-MCNC: 7.4 MG/DL (ref 8.5–10.1)
CHLORIDE SERPL-SCNC: 102 MMOL/L (ref 98–112)
CO2 SERPL-SCNC: 32 MMOL/L (ref 21–32)
CREAT BLD-MCNC: 1.47 MG/DL (ref 0.55–1.02)
DEPRECATED RDW RBC AUTO: 49.9 FL (ref 35.1–46.3)
DEPRECATED RDW RBC AUTO: 50.8 FL (ref 35.1–46.3)
EOSINOPHIL # BLD: 0 X10(3) UL (ref 0–0.7)
EOSINOPHIL # BLD: 0.12 X10(3) UL (ref 0–0.7)
EOSINOPHIL NFR BLD: 0 %
EOSINOPHIL NFR BLD: 1 %
ERYTHROCYTE [DISTWIDTH] IN BLOOD BY AUTOMATED COUNT: 14.3 % (ref 11–15)
ERYTHROCYTE [DISTWIDTH] IN BLOOD BY AUTOMATED COUNT: 14.4 % (ref 11–15)
GLUCOSE BLD-MCNC: 98 MG/DL (ref 70–99)
HAV IGM SER QL: 1.9 MG/DL (ref 1.6–2.6)
HCT VFR BLD AUTO: 30.9 % (ref 35–48)
HCT VFR BLD AUTO: 33.8 % (ref 35–48)
HGB BLD-MCNC: 10.8 G/DL (ref 12–16)
HGB BLD-MCNC: 9.9 G/DL (ref 12–16)
LYMPHOCYTES NFR BLD: 1.38 X10(3) UL (ref 1–4)
LYMPHOCYTES NFR BLD: 1.83 X10(3) UL (ref 1–4)
LYMPHOCYTES NFR BLD: 12 %
LYMPHOCYTES NFR BLD: 15 %
MCH RBC QN AUTO: 29.9 PG (ref 26–34)
MCH RBC QN AUTO: 30.3 PG (ref 26–34)
MCHC RBC AUTO-ENTMCNC: 32 G/DL (ref 31–37)
MCHC RBC AUTO-ENTMCNC: 32 G/DL (ref 31–37)
MCV RBC AUTO: 93.4 FL (ref 80–100)
MCV RBC AUTO: 94.9 FL (ref 80–100)
METAMYELOCYTES # BLD: 0.46 X10(3) UL
METAMYELOCYTES # BLD: 0.49 X10(3) UL
METAMYELOCYTES NFR BLD: 4 %
METAMYELOCYTES NFR BLD: 4 %
MONOCYTES # BLD: 0.37 X10(3) UL (ref 0.1–1)
MONOCYTES # BLD: 0.69 X10(3) UL (ref 0.1–1)
MONOCYTES NFR BLD: 3 %
MONOCYTES NFR BLD: 6 %
MRSA DNA SPEC QL NAA+PROBE: NEGATIVE
MYELOCYTES # BLD: 0.49 X10(3) UL
MYELOCYTES # BLD: 1.27 X10(3) UL
MYELOCYTES NFR BLD: 11 %
MYELOCYTES NFR BLD: 4 %
NEUTROPHILS # BLD AUTO: 6.58 X10 (3) UL (ref 1.5–7.7)
NEUTROPHILS # BLD AUTO: 6.87 X10 (3) UL (ref 1.5–7.7)
NEUTROPHILS NFR BLD: 60 %
NEUTROPHILS NFR BLD: 65 %
NEUTS BAND NFR BLD: 5 %
NEUTS HYPERSEG # BLD: 6.9 X10(3) UL (ref 1.5–7.7)
NEUTS HYPERSEG # BLD: 8.54 X10(3) UL (ref 1.5–7.7)
OSMOLALITY SERPL CALC.SUM OF ELEC: 294 MOSM/KG (ref 275–295)
PHOSPHATE SERPL-MCNC: 2.3 MG/DL (ref 2.5–4.9)
PLATELET # BLD AUTO: 115 10(3)UL (ref 150–450)
PLATELET # BLD AUTO: 129 10(3)UL (ref 150–450)
POTASSIUM SERPL-SCNC: 3.5 MMOL/L (ref 3.5–5.1)
PROMYELOCYTES # BLD: 0.12 X10(3) UL
PROMYELOCYTES # BLD: 0.58 X10(3) UL
PROMYELOCYTES NFR BLD: 1 %
PROMYELOCYTES NFR BLD: 5 %
RBC # BLD AUTO: 3.31 X10(6)UL (ref 3.8–5.3)
RBC # BLD AUTO: 3.56 X10(6)UL (ref 3.8–5.3)
SODIUM SERPL-SCNC: 139 MMOL/L (ref 136–145)
TOTAL CELLS COUNTED: 100
TOTAL CELLS COUNTED: 100
WBC # BLD AUTO: 11.5 X10(3) UL (ref 4–11)
WBC # BLD AUTO: 12.2 X10(3) UL (ref 4–11)

## 2020-01-02 PROCEDURE — 10030 IMG GID FLU COLL DRG SFT TIS: CPT | Performed by: RADIOLOGY

## 2020-01-02 PROCEDURE — 99152 MOD SED SAME PHYS/QHP 5/>YRS: CPT | Performed by: RADIOLOGY

## 2020-01-02 PROCEDURE — 0W9930Z DRAINAGE OF RIGHT PLEURAL CAVITY WITH DRAINAGE DEVICE, PERCUTANEOUS APPROACH: ICD-10-PCS | Performed by: RADIOLOGY

## 2020-01-02 RX ORDER — LOPERAMIDE HYDROCHLORIDE 2 MG/1
2 CAPSULE ORAL 4 TIMES DAILY PRN
Status: DISCONTINUED | OUTPATIENT
Start: 2020-01-02 | End: 2020-01-14

## 2020-01-02 RX ORDER — NALOXONE HYDROCHLORIDE 0.4 MG/ML
80 INJECTION, SOLUTION INTRAMUSCULAR; INTRAVENOUS; SUBCUTANEOUS AS NEEDED
Status: DISCONTINUED | OUTPATIENT
Start: 2020-01-02 | End: 2020-01-14

## 2020-01-02 RX ORDER — IPRATROPIUM BROMIDE AND ALBUTEROL SULFATE 2.5; .5 MG/3ML; MG/3ML
3 SOLUTION RESPIRATORY (INHALATION)
Status: DISCONTINUED | OUTPATIENT
Start: 2020-01-03 | End: 2020-01-15

## 2020-01-02 RX ORDER — MIDAZOLAM HYDROCHLORIDE 1 MG/ML
1 INJECTION INTRAMUSCULAR; INTRAVENOUS EVERY 5 MIN PRN
Status: DISCONTINUED | OUTPATIENT
Start: 2020-01-02 | End: 2020-01-14

## 2020-01-02 RX ORDER — ALBUMIN (HUMAN) 12.5 G/50ML
25 SOLUTION INTRAVENOUS EVERY 8 HOURS
Status: DISPENSED | OUTPATIENT
Start: 2020-01-02 | End: 2020-01-03

## 2020-01-02 RX ORDER — FLUMAZENIL 0.1 MG/ML
0.2 INJECTION, SOLUTION INTRAVENOUS AS NEEDED
Status: DISCONTINUED | OUTPATIENT
Start: 2020-01-02 | End: 2020-01-14

## 2020-01-02 RX ORDER — MIDAZOLAM HYDROCHLORIDE 1 MG/ML
INJECTION INTRAMUSCULAR; INTRAVENOUS
Status: DISPENSED
Start: 2020-01-02 | End: 2020-01-02

## 2020-01-02 NOTE — PROGRESS NOTES
[facility] Hospitalist Progress Note     CC: Hospital Follow up    PCP: None Pcp     Assessment/Plan:     Principal Problem:    Lung mass  Active Problems:    Swelling of lower extremity    Hypoxia    June Schuylerfaby 66year old female with PMH sig- unknown (hasn temperature 97.6 °F (36.4 °C), temperature source Temporal, resp. rate 24, height 5' 2\" (1.575 m), weight 165 lb 9.6 oz (75.1 kg), SpO2 96 %.     Temp:  [97.4 °F (36.3 °C)-98.4 °F (36.9 °C)] 97.6 °F (36.4 °C)  Pulse:  [] 111  Resp:  [16-31] 24  BP: ( CA 8.1* 8.0* 7.4*    138 139   K 3.2* 3.4* 3.5    100 102   CO2 34.0* 32.0 32.0       Recent Labs   Lab 12/29/19  0525 12/30/19  0443 12/31/19  0439 01/01/20  0513 01/02/20  0425   ALB 1.5* 1.3* 1.3* 1.4* 1.3*         Imaging:          Meds:

## 2020-01-02 NOTE — PHYSICAL THERAPY NOTE
Chart reviewed, attempted to see pt for PT tx this PM. Pt very lethargic after procedure, sleeping soundly, not appropriate for therapy at this time. Will f/u tomorrow as appropriate, schedule permitting.

## 2020-01-02 NOTE — PHYSICAL THERAPY NOTE
Chart reviewed, pt to have chest tube placed today. Discussed with RN, to transfer off the floor shortly, transport pending. Will f/u later this PM for PT tx as appropriate, schedule permitting.

## 2020-01-02 NOTE — PROGRESS NOTES
Critical Care Progress Note     Assessment / Plan:  1.  Acute hypoxemic respiratory failure - due to lung mass and postobstructive pneumonia that has now developed into a large cavitary lesion, infectious etiology favored of progression of malignancy  - sherron regular rate and rhythm, no MRG  Abdo - soft, NTND  Ext - no LE edema  Mental status - interactive, answering questions appropriately    Medications:  Reviewed in EMR    Lab Data:  Reviewed in EMR    Imaging:  I independently visualized all relevant chest

## 2020-01-02 NOTE — PROCEDURES
[facility]  Procedure Note    Ash Crain Patient Status:  Inpatient    9/15/1945 MRN [id number]   Location [facility] 2W/SW Attending Asa York, [address] [facility] Day # 23 PCP None Pcp     Procedure: CT guided lung abscess

## 2020-01-02 NOTE — PLAN OF CARE
Problem: Patient Centered Care  Goal: Patient preferences are identified and integrated in the patient's plan of care  Description  Interventions:  - What would you like us to know as we care for you? I haven't been to a doctor in a long time.   I like to Manage/alleviate anxiety  - Monitor for signs/symptoms of CO2 retention  Outcome: Progressing     Problem: COPING  Goal: Pt/Family able to verbalize concerns and demonstrate effective coping strategies  Description  INTERVENTIONS:  - Assist patient/family of falls.   - [place] fall precautions as indicated by assessment.  - Educate pt/family on patient safety including physical limitations  - Instruct pt to call for assistance with activity based on assessment  - Modify environment to reduce risk of injury

## 2020-01-02 NOTE — OCCUPATIONAL THERAPY NOTE
Pt not seen for OT at this time secondary to per nursing, pt will be off of floor for chest tube placement. Will follow up with pt later in date as schedule permits and pt is appropriate.

## 2020-01-02 NOTE — PRE-SEDATION ASSESSMENT
[place] [facility]  IR Pre-Procedure Sedation Assessment    History of snoring or sleep or apnea?    No    History of previous problems with anesthesia or sedation  No    Physical Findings:  Neck: nl ROM  CV: RRR  PULM: decreased breath sounds on ri

## 2020-01-02 NOTE — PROGRESS NOTES
[facility]    Nephrology Progress Note    Yoshi Goldstein Attending:  Jennifer Goodson MD       SUBJECTIVE:     Still w diarrhea   No worsened SOB at this time.     PHYSICAL EXAM:     Vital Signs: /72 (BP Location: Right arm)   Pulse 109 NEPERCENT 90.6 12/20/2019    LYPERCENT 3.5 12/20/2019    MOPERCENT 4.6 12/20/2019    EOPERCENT 0.0 12/20/2019    BAPERCENT 0.1 12/20/2019    NE 12.31 (H) 12/20/2019    LYMABS 0.48 (L) 12/20/2019    MOABSO 0.62 12/20/2019    EOABSO 0.00 12/20/2019    BAABSO BID  PEG 3350 (MIRALAX) powder packet 17 g, 17 g, Oral, Daily  allopurinol (ZYLOPRIM) tab 300 mg, 300 mg, Oral, BID  Normal Saline Flush 0.9 % injection 10 mL, 10 mL, Intravenous, PRN  Fluticasone Furoate-Vilanterol (BREO ELLIPTA) 200-25 MCG/INH inhaler 1 MD Elias [address] [facility]  Nephrology

## 2020-01-02 NOTE — PROGRESS NOTES
32 [facility] Infectious Disease Progress Note    Daysi Cunninghamsudarshan Patient Status:  Inpatient    9/15/1945 MRN [id number]   Location [facility] 2W/SW Attending Maggie Gonzalez, [address] [facility] Day # 19 PCP None Pcp     Subjective:  Pt with no injection 3 mL, 3 mL, Intravenous, PRN  •  acetaminophen (TYLENOL) tab 650 mg, 650 mg, Oral, Q4H PRN **OR** [DISCONTINUED] HYDROcodone-acetaminophen (NORCO) 5-325 MG per tab 1 tablet, 1 tablet, Oral, Q4H PRN **OR** [DISCONTINUED] HYDROcodone-acetaminophen CREATSERUM 1.47 01/02/2020    BUN 29 01/02/2020     01/02/2020    K 3.5 01/02/2020     01/02/2020    CO2 32.0 01/02/2020    GLU 98 01/02/2020    CA 7.4 01/02/2020    ALB 1.3 01/02/2020    MG 1.9 01/02/2020    PHOS 2.3 01/02/2020         Assessm

## 2020-01-02 NOTE — PROGRESS NOTES
[facility] [place] [facility]    Nephrology Progress Note    Gretta Primes Attending:  Geoff De Luna MD       SUBJECTIVE:     Still w diarrhea   Po intake poor, drinking some ensure  Dyspnea unchanged  No other complaints    PHYSICAL EXAM:     Vital Signs:  B 12/30/2019    MON 0 12/30/2019    EOS 2 12/30/2019    BASO 0 12/30/2019    NEPERCENT 90.6 12/20/2019    LYPERCENT 3.5 12/20/2019    MOPERCENT 4.6 12/20/2019    EOPERCENT 0.0 12/20/2019    BAPERCENT 0.1 12/20/2019    NE 12.31 (H) 12/20/2019    LYMABS 0.48 ( suppository 10 mg, 10 mg, Rectal, Daily PRN  docusate sodium (COLACE) cap 100 mg, 100 mg, Oral, BID  PEG 3350 (MIRALAX) powder packet 17 g, 17 g, Oral, Daily  allopurinol (ZYLOPRIM) tab 300 mg, 300 mg, Oral, BID  Normal Saline Flush 0.9 % injection 10 mL, consult, plan for pigtail chest tube of cavitary lung lesion on 1/2    Thank you for allowing me to participate in the care of this patient. Please do not hesitate to call with questions or concerns.     D/w RN    MD Melecio [place] [id number] [address]

## 2020-01-02 NOTE — PROGRESS NOTES
Thoracic Surgery Progress Note     Gurmeet Tavares is a 66year old female. MRN [id number]. Admitted 2019    [address] [place] [address] EVENTS:     No acute events overnight.  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 Sputum cultures + for pseudomonas and candida. IR drain placed today.    Zosyn, vanc, vor

## 2020-01-02 NOTE — PROGRESS NOTES
[facility]  Progress Note    Ash Crain Patient Status:  Inpatient    9/15/1945 MRN [id number]   Location [facility] 4W/SW/SE Attending Tim Pena MD   [facility] Day # 19 PCP None Pcp     Subjective:  Ash Crain is a(n) 66 yea the previously seen pulmonary malignancy -main considerations bacterial , fungal and TB.   2. Metastatic mediastinal and hilar lymphadenopathy not well evaluated without contrast.  3. Small right greater than left pleural effusions with compressive atelecta

## 2020-01-02 NOTE — PLAN OF CARE
Problem: RESPIRATORY - ADULT  Goal: Achieves optimal ventilation and oxygenation  Description  INTERVENTIONS:  - Assess for changes in respiratory status  - Assess for changes in mentation and behavior  - Position to facilitate oxygenation and minimize r with activity based on assessment  - Modify environment to reduce risk of injury  - Provide assistive devices as appropriate  - Consider OT/PT consult to assist with strengthening/mobility  - Encourage toileting schedule  Outcome: Progressing  Note:   Bed

## 2020-01-02 NOTE — DIETARY NOTE
ADULT NUTRITION UPDATE NOTE     Recommend: CPM  NUTRITION DIAGNOSIS/PROBLEM:  Inadequate oral intake related to decreased ability to consume sufficient energy as evidenced by no appetite for the past 2-3 days (recent chemo tx possible etiology).   Leilani Cazares Pt NPO today for chest tube today and now to drink Ensure Enlive and asked for X2 ice-cream. Encouraging po intake and if nothing else to drink the supplements to avoid feeding tube.      ANTHROPOMETRICS:  HT: 157.5 cm (5' 2\")  WT: 75.1 kg (165 lb 9.6 oz)

## 2020-01-03 ENCOUNTER — APPOINTMENT (OUTPATIENT)
Dept: PICC SERVICES | Facility: HOSPITAL | Age: 75
DRG: 177 | End: 2020-01-03
Attending: INTERNAL MEDICINE
Payer: MEDICARE

## 2020-01-03 LAB
ALBUMIN SERPL-MCNC: 1.7 G/DL (ref 3.4–5)
ANION GAP SERPL CALC-SCNC: 4 MMOL/L (ref 0–18)
BASOPHILS # BLD: 0 X10(3) UL (ref 0–0.2)
BASOPHILS NFR BLD: 0 %
BLASTS # BLD: 0.37 X10(3) UL
BLASTS NFR BLD: 2 %
BUN BLD-MCNC: 26 MG/DL (ref 7–18)
BUN/CREAT SERPL: 19.3 (ref 10–20)
CALCIUM BLD-MCNC: 7.8 MG/DL (ref 8.5–10.1)
CHLORIDE SERPL-SCNC: 103 MMOL/L (ref 98–112)
CO2 SERPL-SCNC: 34 MMOL/L (ref 21–32)
CREAT BLD-MCNC: 1.35 MG/DL (ref 0.55–1.02)
DEPRECATED RDW RBC AUTO: 50.4 FL (ref 35.1–46.3)
EOSINOPHIL # BLD: 0.19 X10(3) UL (ref 0–0.7)
EOSINOPHIL NFR BLD: 1 %
ERYTHROCYTE [DISTWIDTH] IN BLOOD BY AUTOMATED COUNT: 14.8 % (ref 11–15)
GLUCOSE BLD-MCNC: 96 MG/DL (ref 70–99)
HAV IGM SER QL: 1.9 MG/DL (ref 1.6–2.6)
HCT VFR BLD AUTO: 31.5 % (ref 35–48)
HGB BLD-MCNC: 10.3 G/DL (ref 12–16)
LYMPHOCYTES NFR BLD: 1.12 X10(3) UL (ref 1–4)
LYMPHOCYTES NFR BLD: 4 %
M TB IFN-G CD4+ T-CELLS BLD-ACNC: 0.11 IU/ML
M TB TUBERC IFN-G BLD QL: NEGATIVE
M TB TUBERC IGNF/MITOGEN IGNF CONTROL: 1.21 IU/ML
MCH RBC QN AUTO: 30.4 PG (ref 26–34)
MCHC RBC AUTO-ENTMCNC: 32.7 G/DL (ref 31–37)
MCV RBC AUTO: 92.9 FL (ref 80–100)
METAMYELOCYTES # BLD: 0.75 X10(3) UL
METAMYELOCYTES NFR BLD: 4 %
MONOCYTES # BLD: 0.75 X10(3) UL (ref 0.1–1)
MONOCYTES NFR BLD: 4 %
MYELOCYTES # BLD: 0.56 X10(3) UL
MYELOCYTES NFR BLD: 3 %
NEUTROPHILS # BLD AUTO: 11.13 X10 (3) UL (ref 1.5–7.7)
NEUTROPHILS NFR BLD: 62 %
NEUTS BAND NFR BLD: 16 %
NEUTS HYPERSEG # BLD: 14.59 X10(3) UL (ref 1.5–7.7)
NRBC BLD MANUAL-RTO: 1 %
OSMOLALITY SERPL CALC.SUM OF ELEC: 297 MOSM/KG (ref 275–295)
PHOSPHATE SERPL-MCNC: 2.3 MG/DL (ref 2.5–4.9)
PLATELET # BLD AUTO: 205 10(3)UL (ref 150–450)
PLATELET MORPHOLOGY: NORMAL
POTASSIUM SERPL-SCNC: 3.4 MMOL/L (ref 3.5–5.1)
PROMYELOCYTES # BLD: 0.37 X10(3) UL
PROMYELOCYTES NFR BLD: 2 %
QUANTIFERON TB1 MINUS NIL: -0.02 IU/ML
QUANTIFERON TB2 MINUS NIL: -0.03 IU/ML
RBC # BLD AUTO: 3.39 X10(6)UL (ref 3.8–5.3)
SODIUM SERPL-SCNC: 141 MMOL/L (ref 136–145)
TOTAL CELLS COUNTED: 100
VARIANT LYMPHS NFR BLD MANUAL: 2 %
WBC # BLD AUTO: 18.7 X10(3) UL (ref 4–11)

## 2020-01-03 PROCEDURE — 05HY33Z INSERTION OF INFUSION DEVICE INTO UPPER VEIN, PERCUTANEOUS APPROACH: ICD-10-PCS | Performed by: INTERNAL MEDICINE

## 2020-01-03 RX ORDER — HEPARIN SODIUM 5000 [USP'U]/ML
5000 INJECTION, SOLUTION INTRAVENOUS; SUBCUTANEOUS EVERY 8 HOURS SCHEDULED
Status: DISCONTINUED | OUTPATIENT
Start: 2020-01-03 | End: 2020-01-17

## 2020-01-03 RX ORDER — LIDOCAINE HYDROCHLORIDE 10 MG/ML
0.5 INJECTION, SOLUTION INFILTRATION; PERINEURAL ONCE AS NEEDED
Status: ACTIVE | OUTPATIENT
Start: 2020-01-03 | End: 2020-01-03

## 2020-01-03 RX ORDER — VORICONAZOLE 200 MG/1
200 TABLET ORAL EVERY 12 HOURS SCHEDULED
Status: DISCONTINUED | OUTPATIENT
Start: 2020-01-03 | End: 2020-01-10

## 2020-01-03 RX ORDER — SODIUM CHLORIDE 0.9 % (FLUSH) 0.9 %
10 SYRINGE (ML) INJECTION AS NEEDED
Status: DISCONTINUED | OUTPATIENT
Start: 2020-01-03 | End: 2020-01-20

## 2020-01-03 NOTE — PROGRESS NOTES
Vascular Access Note  Inserted by Billie Thurston RN    Vascular Access Screening:   Allergies to Lidocaine: no  Allergies to Latex: no  Presence of Pacemaker/Defibrillator: No  Mastectomy with Lymph Node Dissection: No  AV Fistula / AV Graft: No  Dialysis Cathete

## 2020-01-03 NOTE — PLAN OF CARE
Problem: Patient Centered Care  Goal: Patient preferences are identified and integrated in the patient's plan of care  Description  Interventions:  - What would you like us to know as we care for you? I haven't been to a doctor in a long time.   I like to Manage/alleviate anxiety  - Monitor for signs/symptoms of CO2 retention  Outcome: Progressing     Problem: COPING  Goal: Pt/Family able to verbalize concerns and demonstrate effective coping strategies  Description  INTERVENTIONS:  - Assist patient/family of falls.   - [place] fall precautions as indicated by assessment.  - Educate pt/family on patient safety including physical limitations  - Instruct pt to call for assistance with activity based on assessment  - Modify environment to reduce risk of injury

## 2020-01-03 NOTE — PROGRESS NOTES
Pulmonary Progress Note     Assessment / Plan:  1.  Acute hypoxemic respiratory failure - due to lung mass and postobstructive pneumonia that has now developed into a large cavitary lesion, infectious etiology favored of progression of malignancy  - maintai appropriately    Medications:  Reviewed in EMR    Lab Data:  Reviewed in EMR    Imaging:  I independently visualized all relevant chest imaging in PACS and agree with radiology interpretation except where noted.

## 2020-01-03 NOTE — PROGRESS NOTES
32 [facility] Infectious Disease Progress Note     Patient Status:  Inpatient    9/15/1945 MRN [id number]   Location [facility] 2W/SW Attending Perla Kyle, [address] [facility] Day # 20 PCP None Pcp     Subjective:  Pt with no 10 mg, Rectal, Daily PRN  •  allopurinol (ZYLOPRIM) tab 300 mg, 300 mg, Oral, BID  •  Normal Saline Flush 0.9 % injection 10 mL, 10 mL, Intravenous, PRN  •  Fluticasone Furoate-Vilanterol (BREO ELLIPTA) 200-25 MCG/INH inhaler 1 puff, 1 puff, Inhalation, Da clubbing or cyanosis. Skin: Normal texture and turgor. Neurologic: Cranial nerves are grossly intact. Motor strength and sensory examination is grossly normal.  No focal neurologic deficit.     Labs:  Lab Results   Component Value Date    WBC 18.7 01/0

## 2020-01-03 NOTE — PROGRESS NOTES
[facility]  Progress Note    Maura Maynard Patient Status:  Inpatient    9/15/1945 MRN [id number]   Location [facility] 4W/SW/SE Attending Jesusita Arias MD   Hosp Day # 20 PCP None Pcp     Subjective:  Maura Maynard is a(n) 66 yea previously seen pulmonary malignancy -main considerations bacterial , fungal and TB. 2. Metastatic mediastinal and hilar lymphadenopathy not well evaluated without contrast.  3. Small right greater than left pleural effusions with compressive atelectasis.

## 2020-01-03 NOTE — PHYSICAL THERAPY NOTE
PHYSICAL THERAPY TREATMENT NOTE - INPATIENT     Room Number: 059/666-Y       Presenting Problem: lung mass new dx     Problem List  Principal Problem:    Lung mass  Active Problems:    Swelling of lower extremity    Hypoxia      PHYSICAL THERAPY ASSESSMENT training;Balance training;Transfer training;Strengthening    SUBJECTIVE  \"I feel sweaty. \"    OBJECTIVE  Precautions: Other (Comment); Chest tube(neutropenic)    WEIGHT BEARING RESTRICTION  Weight Bearing Restriction: None    PAIN ASSESSMENT   Rating: (dis effort)  Stoop/Curb Assistance: Not tested  Comment : supine to sit tx at MOD A with use of bed rail and increased time; sit to stand tx with rolling walker at MIN A x 2    Patient End of Session: Up in chair;Needs met;Call light within reach;RN aware of s

## 2020-01-03 NOTE — PROGRESS NOTES
Thoracic Surgery Progress Note     Cydney Rosenthal is a 66year old female. MRN [id number]. Admitted 2019    [facility] EVENTS:     No acute events overnight. Feeling better today.        Objective:     VITALS:     Temp (24hrs), Av °F (36.7 undergoing chemo (started 12/19) c/b PNA with RUL infiltrate that has progressed to large right upper lobe cavitation. Sputum cultures + for pseudomonas and candida. Feeling slightly better today. Zosyn, vanc, voriconazole per ID.    Encourage IS and

## 2020-01-03 NOTE — PROGRESS NOTES
[facility] Hospitalist Progress Note     CC: Hospital Follow up    PCP: None Pcp     Assessment/Plan:     Principal Problem:    Lung mass  Active Problems:    Swelling of lower extremity    Hypoxia    Carolynnisudarshan Parr 66year old female with PMH sig- unknown (hasn OBJECTIVE:    Blood pressure 93/51, pulse 105, temperature 97.8 °F (36.6 °C), temperature source Temporal, resp. rate 16, height 5' 2\" (1.575 m), weight 165 lb 9.6 oz (75.1 kg), SpO2 95 %.     Temp:  [97.3 °F (36.3 °C)-98.9 °F (37.2 °C)] 97.8 °F (36.6 7. 4* 7.8*    139 141   K 3.4* 3.5 3.4*    102 103   CO2 32.0 32.0 34.0*       Recent Labs   Lab 12/30/19  0443 12/31/19  0439 01/01/20  0513 01/02/20  0425 01/03/20  0437   ALB 1.3* 1.3* 1.4* 1.3* 1.7*         Imaging:  Ct Drain Abscess Subcuta

## 2020-01-03 NOTE — PLAN OF CARE
Problem: RESPIRATORY - ADULT  Goal: Achieves optimal ventilation and oxygenation  Description  INTERVENTIONS:  - Assess for changes in respiratory status  - Assess for changes in mentation and behavior  - Position to facilitate oxygenation and minimize r pt to call for assistance with activity based on assessment  - Modify environment to reduce risk of injury  - Provide assistive devices as appropriate  - Consider OT/PT consult to assist with strengthening/mobility  - Encourage toileting schedule  Outcome:

## 2020-01-03 NOTE — PROGRESS NOTES
[facility]    Nephrology Progress Note    Amey Primrose Attending:  Alejandra Ziegler MD       SUBJECTIVE:     No new symptoms today.     PHYSICAL EXAM:     Vital Signs: /73 (BP Location: Right arm)   Pulse (!) 126   Temp 98.9 °F (37.2 °C NEPERCENT 90.6 12/20/2019    LYPERCENT 3.5 12/20/2019    MOPERCENT 4.6 12/20/2019    EOPERCENT 0.0 12/20/2019    BAPERCENT 0.1 12/20/2019    NE 12.31 (H) 12/20/2019    LYMABS 0.48 (L) 12/20/2019    MOABSO 0.62 12/20/2019    EOABSO 0.00 12/20/2019    BAABSO 3 mL, Nebulization, Q4H PRN  ondansetron HCl (ZOFRAN) 8 mg in sodium chloride 0.9% 104 mL IVPB, 8 mg, Intravenous, TID PRN  bisacodyl (DULCOLAX) rectal suppository 10 mg, 10 mg, Rectal, Daily PRN  allopurinol (ZYLOPRIM) tab 300 mg, 300 mg, Oral, BID  [facility] consult    Thank you for allowing me to participate in the care of this patient. Please do not hesitate to call with questions or concerns.     D/w RN    Bhanu Cardenas MD  [address]  Nephrology

## 2020-01-03 NOTE — OCCUPATIONAL THERAPY NOTE
OCCUPATIONAL THERAPY TREATMENT NOTE - INPATIENT        Room Number: 763/225-I                Problem List  Principal Problem:    Lung mass  Active Problems:    Swelling of lower extremity    Hypoxia      OCCUPATIONAL THERAPY ASSESSMENT     RN cleared pt fo to 89-90% with 2-3 min seated rest and verbal cues for diaphragmatic breathing     Post-tx to chair, seated:  SPO2 89-90% on 9L HF  -115 bpm  *mild fatigue and SOB, chest discomfort at tube site, symptoms improved with 2 min rest    ACTIVITIES OF THOR  on: 19  Frequency: 3-5x week

## 2020-01-04 ENCOUNTER — APPOINTMENT (OUTPATIENT)
Dept: GENERAL RADIOLOGY | Facility: HOSPITAL | Age: 75
DRG: 177 | End: 2020-01-04
Attending: INTERNAL MEDICINE
Payer: MEDICARE

## 2020-01-04 LAB
ALBUMIN SERPL-MCNC: 1.6 G/DL (ref 3.4–5)
ANION GAP SERPL CALC-SCNC: 4 MMOL/L (ref 0–18)
BASOPHILS # BLD: 0 X10(3) UL (ref 0–0.2)
BASOPHILS NFR BLD: 0 %
BLASTS # BLD: 0.25 X10(3) UL
BLASTS NFR BLD: 1 %
BUN BLD-MCNC: 23 MG/DL (ref 7–18)
BUN/CREAT SERPL: 16.7 (ref 10–20)
CALCIUM BLD-MCNC: 7.2 MG/DL (ref 8.5–10.1)
CHLORIDE SERPL-SCNC: 104 MMOL/L (ref 98–112)
CO2 SERPL-SCNC: 33 MMOL/L (ref 21–32)
CREAT BLD-MCNC: 1.38 MG/DL (ref 0.55–1.02)
DEPRECATED RDW RBC AUTO: 51.2 FL (ref 35.1–46.3)
EOSINOPHIL # BLD: 0 X10(3) UL (ref 0–0.7)
EOSINOPHIL NFR BLD: 0 %
ERYTHROCYTE [DISTWIDTH] IN BLOOD BY AUTOMATED COUNT: 14.6 % (ref 11–15)
GLUCOSE BLD-MCNC: 106 MG/DL (ref 70–99)
HAV IGM SER QL: 1.7 MG/DL (ref 1.6–2.6)
HCT VFR BLD AUTO: 31.2 % (ref 35–48)
HGB BLD-MCNC: 9.8 G/DL (ref 12–16)
LYMPHOCYTES NFR BLD: 10 %
LYMPHOCYTES NFR BLD: 2.51 X10(3) UL (ref 1–4)
MCH RBC QN AUTO: 29.7 PG (ref 26–34)
MCHC RBC AUTO-ENTMCNC: 31.4 G/DL (ref 31–37)
MCV RBC AUTO: 94.5 FL (ref 80–100)
METAMYELOCYTES # BLD: 1.51 X10(3) UL
METAMYELOCYTES NFR BLD: 6 %
MONOCYTES # BLD: 1.26 X10(3) UL (ref 0.1–1)
MONOCYTES NFR BLD: 5 %
NEUTROPHILS # BLD AUTO: 14.05 X10 (3) UL (ref 1.5–7.7)
NEUTROPHILS NFR BLD: 47 %
NEUTS BAND NFR BLD: 30 %
NEUTS HYPERSEG # BLD: 19.33 X10(3) UL (ref 1.5–7.7)
OSMOLALITY SERPL CALC.SUM OF ELEC: 296 MOSM/KG (ref 275–295)
PHOSPHATE SERPL-MCNC: 2 MG/DL (ref 2.5–4.9)
PLATELET # BLD AUTO: 299 10(3)UL (ref 150–450)
POTASSIUM SERPL-SCNC: 3.6 MMOL/L (ref 3.5–5.1)
PROMYELOCYTES # BLD: 0.25 X10(3) UL
PROMYELOCYTES NFR BLD: 1 %
RBC # BLD AUTO: 3.3 X10(6)UL (ref 3.8–5.3)
SODIUM SERPL-SCNC: 141 MMOL/L (ref 136–145)
TOTAL CELLS COUNTED: 100
WBC # BLD AUTO: 25.1 X10(3) UL (ref 4–11)

## 2020-01-04 PROCEDURE — 71045 X-RAY EXAM CHEST 1 VIEW: CPT | Performed by: INTERNAL MEDICINE

## 2020-01-04 RX ORDER — MAGNESIUM SULFATE HEPTAHYDRATE 40 MG/ML
2 INJECTION, SOLUTION INTRAVENOUS ONCE
Status: COMPLETED | OUTPATIENT
Start: 2020-01-04 | End: 2020-01-04

## 2020-01-04 RX ORDER — LOPERAMIDE HYDROCHLORIDE 2 MG/1
2 CAPSULE ORAL ONCE
Status: COMPLETED | OUTPATIENT
Start: 2020-01-04 | End: 2020-01-04

## 2020-01-04 NOTE — PROGRESS NOTES
[address] [facility] Patient Status:  Inpatient    9/15/1945 MRN [id number]   Location [facility] 2W/SW Attending Lesly Torre,    Hosp Day # 21 PCP None Pcp     Pulm / Critical Care Progress Note     S: increasing O2 needs ov bs bilat   Chest wall: No tenderness or deformity. Heart: Regular rate and rhythm, normal S1S2, no murmur. Abdomen: soft, non-tender, non-distended, positive BS.    Extremity: + edema       Recent Labs   Lab 01/01/20 2011 01/02/20  0425 01/03/20  9771 chemotherapy. Improved  - has received neuopogen  - monitor  6. MEAGHAN  - per renal  7. Diarrhea  - C. diff negative  8. PPx  - sc heparin  9. FEN  - general diet as tolerated  10.  Dispo  - full code   - pcu  - critically ill and at risk for decompensation

## 2020-01-04 NOTE — PROGRESS NOTES
[facility] Hospitalist Progress Note     CC: Hospital Follow up    PCP: None Pcp     Assessment/Plan:       Jewell Allen 66year old female with PMH sig- unknown (hasn't seen a doctor in years) nicotine use x years here with SOB and leg swelling, found to have weak    OBJECTIVE:    Blood pressure 129/66, pulse (!) 129, temperature 97.8 °F (36.6 °C), temperature source Temporal, resp. rate 25, height 5' 2\" (1.575 m), weight 162 lb 9.6 oz (73.8 kg), SpO2 (!) 88 %. Temp:  [96 °F (35.6 °C)-98.8 °F (37.1 °C)] 97. 1.47* 1.35* 1.38*   GFRAA 40* 44* 43*   GFRNAA 34* 38* 37*   CA 7.4* 7.8* 7.2*    141 141   K 3.5 3.4* 3.6    103 104   CO2 32.0 34.0* 33.0*       Recent Labs   Lab 12/31/19  0439 01/01/20  0513 01/02/20  0425 01/03/20  0437 01/04/20  0457   AL ondansetron (ZOFRAN) IVPB, bisacodyl, Normal Saline Flush, Normal Saline Flush, acetaminophen **OR** [DISCONTINUED] HYDROcodone-acetaminophen **OR** [DISCONTINUED] HYDROcodone-acetaminophen, ondansetron HCl, influenza virus vaccine PF, hydrALAzine HCl

## 2020-01-04 NOTE — PROGRESS NOTES
Patient son-in-law requesting to talk with oncologist regarding status/plan of patient, patient is alert and oriented and verbalizes her consent for physician to speak with son-in-law Charla Rubio.  Received call back from [address] [place] [address] and informed of family Christelle Ty

## 2020-01-04 NOTE — PLAN OF CARE
Problem: CARDIOVASCULAR - ADULT  Goal: Maintains optimal cardiac output and hemodynamic stability  Description  INTERVENTIONS:  - Monitor vital signs, rhythm, and trends  - Monitor for bleeding, hypotension and signs of decreased cardiac output  - Evalua ADULT  Goal: Absence of cardiac arrhythmias or at baseline  Description  INTERVENTIONS:  - Continuous cardiac monitoring, monitor vital signs, obtain 12 lead EKG if indicated  - Evaluate effectiveness of antiarrhythmic and heart rate control medications as

## 2020-01-04 NOTE — PROGRESS NOTES
[facility]  Progress Note    Yoshi Goldstein Patient Status:  Inpatient    9/15/1945 MRN [id number]   Location [facility] 4W/SW/SE Attending Nimesh Edwards,  [facility]  Day # 21 PCP None Pcp     Subjective:  Yoshi Goldstein is a(n) 66 yea previously seen pulmonary malignancy -main considerations bacterial , fungal and TB. 2. Metastatic mediastinal and hilar lymphadenopathy not well evaluated without contrast.  3. Small right greater than left pleural effusions with compressive atelectasis.

## 2020-01-04 NOTE — PROGRESS NOTES
32 [facility] Infectious Disease Progress Note    Shayna Washburn Patient Status:  Inpatient    9/15/1945 MRN [id number]   Location [facility] 2W/SW Attending Frederick Sender, [address] [facility] Day # 21 PCP None Pcp     Subjective:  Pt with no Intravenous, PRN  •  acetaminophen (TYLENOL) tab 650 mg, 650 mg, Oral, Q4H PRN **OR** [DISCONTINUED] HYDROcodone-acetaminophen (NORCO) 5-325 MG per tab 1 tablet, 1 tablet, Oral, Q4H PRN **OR** [DISCONTINUED] HYDROcodone-acetaminophen (NORCO) 5-325 MG per t 01/04/2020    BUN 23 01/04/2020     01/04/2020    K 3.6 01/04/2020     01/04/2020    CO2 33.0 01/04/2020     01/04/2020    CA 7.2 01/04/2020    ALB 1.6 01/04/2020    MG 1.7 01/04/2020    PHOS 2.0 01/04/2020         Assessment/Plan:    1.

## 2020-01-05 ENCOUNTER — APPOINTMENT (OUTPATIENT)
Dept: GENERAL RADIOLOGY | Facility: HOSPITAL | Age: 75
DRG: 177 | End: 2020-01-05
Attending: INTERNAL MEDICINE
Payer: MEDICARE

## 2020-01-05 LAB
ALBUMIN SERPL-MCNC: 1.5 G/DL (ref 3.4–5)
ANION GAP SERPL CALC-SCNC: 4 MMOL/L (ref 0–18)
BASOPHILS # BLD: 0 X10(3) UL (ref 0–0.2)
BASOPHILS NFR BLD: 0 %
BLASTS # BLD: 0.22 X10(3) UL
BLASTS NFR BLD: 1 %
BUN BLD-MCNC: 24 MG/DL (ref 7–18)
BUN/CREAT SERPL: 18.8 (ref 10–20)
CALCIUM BLD-MCNC: 7.7 MG/DL (ref 8.5–10.1)
CHLORIDE SERPL-SCNC: 103 MMOL/L (ref 98–112)
CO2 SERPL-SCNC: 33 MMOL/L (ref 21–32)
CREAT BLD-MCNC: 1.28 MG/DL (ref 0.55–1.02)
DEPRECATED RDW RBC AUTO: 52.4 FL (ref 35.1–46.3)
EOSINOPHIL # BLD: 0 X10(3) UL (ref 0–0.7)
EOSINOPHIL NFR BLD: 0 %
ERYTHROCYTE [DISTWIDTH] IN BLOOD BY AUTOMATED COUNT: 14.9 % (ref 11–15)
GLUCOSE BLD-MCNC: 89 MG/DL (ref 70–99)
HAV IGM SER QL: 2.2 MG/DL (ref 1.6–2.6)
HCT VFR BLD AUTO: 28.4 % (ref 35–48)
HGB BLD-MCNC: 8.9 G/DL (ref 12–16)
LYMPHOCYTES NFR BLD: 1.32 X10(3) UL (ref 1–4)
LYMPHOCYTES NFR BLD: 4 %
MCH RBC QN AUTO: 30.1 PG (ref 26–34)
MCHC RBC AUTO-ENTMCNC: 31.3 G/DL (ref 31–37)
MCV RBC AUTO: 95.9 FL (ref 80–100)
METAMYELOCYTES # BLD: 2.64 X10(3) UL
METAMYELOCYTES NFR BLD: 12 %
MONOCYTES # BLD: 1.1 X10(3) UL (ref 0.1–1)
MONOCYTES NFR BLD: 5 %
MYELOCYTES # BLD: 0.22 X10(3) UL
MYELOCYTES NFR BLD: 1 %
NEUTROPHILS # BLD AUTO: 12.6 X10 (3) UL (ref 1.5–7.7)
NEUTROPHILS NFR BLD: 61 %
NEUTS BAND NFR BLD: 14 %
NEUTS HYPERSEG # BLD: 16.5 X10(3) UL (ref 1.5–7.7)
NRBC BLD MANUAL-RTO: 1 %
OSMOLALITY SERPL CALC.SUM OF ELEC: 294 MOSM/KG (ref 275–295)
PHOSPHATE SERPL-MCNC: 2.3 MG/DL (ref 2.5–4.9)
PLATELET # BLD AUTO: 306 10(3)UL (ref 150–450)
PLATELET MORPHOLOGY: NORMAL
POTASSIUM SERPL-SCNC: 3.6 MMOL/L (ref 3.5–5.1)
RBC # BLD AUTO: 2.96 X10(6)UL (ref 3.8–5.3)
SODIUM SERPL-SCNC: 140 MMOL/L (ref 136–145)
TOTAL CELLS COUNTED: 100
VARIANT LYMPHS NFR BLD MANUAL: 2 %
WBC # BLD AUTO: 22 X10(3) UL (ref 4–11)

## 2020-01-05 PROCEDURE — 71045 X-RAY EXAM CHEST 1 VIEW: CPT | Performed by: INTERNAL MEDICINE

## 2020-01-05 RX ORDER — POTASSIUM CHLORIDE 1.5 G/1.77G
40 POWDER, FOR SOLUTION ORAL EVERY 4 HOURS
Status: COMPLETED | OUTPATIENT
Start: 2020-01-05 | End: 2020-01-05

## 2020-01-05 NOTE — PLAN OF CARE
VSS. A/o x 4. Verbalizes needs. On Vapotherm 30L, FiO2 80%, SpO2 stable. Right anterior chest tube intact and draining. t denies pain / SOB at rest. Exertional dyspnea. Poor appetite, but had some Ensure for dinner.  Pt having frequent loose stools and fermin and minimize respiratory effort  - Oxygen supplementation based on oxygen saturation or ABGs  - Provide Smoking Cessation handout, if applicable  - Encourage broncho-pulmonary hygiene including cough, deep breathe, Incentive Spirometry  - Assess the need f heart rate control medications as ordered  - Initiate emergency measures for life threatening arrhythmias  - Monitor electrolytes and administer replacement therapy as ordered  Outcome: Progressing     Problem: SAFETY ADULT - FALL  Goal: Free from fall inj

## 2020-01-05 NOTE — PROGRESS NOTES
[facility] Patient Status:  Inpatient    9/15/1945 MRN [id number]   Location [facility] 2W/SW Attending Crescencio Toledo, 160[address] [facility] Day # 25 PCP None Pcp     Pulm / Critical Care Progress Note     S: ct with minimal output deformity. Heart: Regular rate and rhythm, normal S1S2, no murmur. Abdomen: soft, non-tender, non-distended, positive BS.    Extremity: 1+ edema ble       Recent Labs   Lab 01/02/20  0425 01/03/20  0437 01/04/20  0457   WBC 12.2* 18.7* 25.1*   HGB 9.9* chemotherapy. Improved  - has received neuopogen  - monitor  6. MEAGHAN  - per renal  7. Diarrhea  - C. diff negative  8. PPx  - sc heparin  9. FEN  - general diet as tolerated  10.  Dispo  - full code   - pcu  - critically ill and at risk for decompensation

## 2020-01-05 NOTE — PROGRESS NOTES
[facility]    Nephrology Progress Note    Yoshi Goldstein Attending:  Jennifer Goodson MD       SUBJECTIVE:     No new symptoms today.     PHYSICAL EXAM:     Vital Signs: /58   Pulse 101   Temp 97 °F (36.1 °C) (Temporal)   Resp 24   Ht 5' 3.5 12/20/2019    MOPERCENT 4.6 12/20/2019    EOPERCENT 0.0 12/20/2019    BAPERCENT 0.1 12/20/2019    NE 12.31 (H) 12/20/2019    LYMABS 0.48 (L) 12/20/2019    MOABSO 0.62 12/20/2019    EOABSO 0.00 12/20/2019    BAABSO 0.02 12/20/2019     Lab Results   Comp 0.9% 104 mL IVPB, 8 mg, Intravenous, TID PRN  bisacodyl (DULCOLAX) rectal suppository 10 mg, 10 mg, Rectal, Daily PRN  allopurinol (ZYLOPRIM) tab 300 mg, 300 mg, Oral, BID  Normal Saline Flush 0.9 % injection 10 mL, 10 mL, Intravenous, PRN  Fluticasone Fur

## 2020-01-05 NOTE — PROGRESS NOTES
[facility] Hospitalist Progress Note     CC: Hospital Follow up    PCP: None Pcp     Assessment/Plan:       Kelvin Mason 66year old female with PMH sig- unknown (hasn't seen a doctor in years) nicotine use x years here with SOB and leg swelling, found to have requirements pretty much unchanged     OBJECTIVE:    Blood pressure 107/50, pulse 95, temperature 98 °F (36.7 °C), temperature source Temporal, resp. rate 20, height 5' 2\" (1.575 m), weight 162 lb 9.6 oz (73.8 kg), SpO2 95 %.     Temp:  [97 °F (36.1 °C)-99 23* 24*   CREATSERUM 1.35* 1.38* 1.28*   GFRAA 44* 43* 47*   GFRNAA 38* 37* 41*   CA 7.8* 7.2* 7.7*    141 140   K 3.4* 3.6 3.6    104 103   CO2 34.0* 33.0* 33.0*       Recent Labs   Lab 01/01/20  0513 01/02/20  0425 01/03/20  0437 01/04/20  04 Loperamide HCl, Metoclopramide HCl, ipratropium-albuterol, ondansetron (ZOFRAN) IVPB, bisacodyl, Normal Saline Flush, Normal Saline Flush, acetaminophen **OR** [DISCONTINUED] HYDROcodone-acetaminophen **OR** [DISCONTINUED] HYDROcodone-acetaminophen, ondans

## 2020-01-05 NOTE — PROGRESS NOTES
32 [facility] Infectious Disease Progress Note    Amey Primrose Patient Status:  Inpatient    9/15/1945 MRN [id number]   Location [place] [address]W/SW Attending Cindy Garcia, 160[address] [facility] Day # 22 PCP None Pcp     Subjective:  Patient see IM B12 is an excluded benefit under patient's insurance plan.  Please advise.   200-25 MCG/INH inhaler 1 puff, 1 puff, Inhalation, Daily  •  Normal Saline Flush 0.9 % injection 3 mL, 3 mL, Intravenous, PRN  •  acetaminophen (TYLENOL) tab 650 mg, 650 mg, Oral, Q4H PRN **OR** [DISCONTINUED] HYDROcodone-acetaminophen (NORCO) 5-325 MG per 01/05/2020    HGB 8.9 01/05/2020    HCT 28.4 01/05/2020    .0 01/05/2020    CREATSERUM 1.28 01/05/2020    BUN 24 01/05/2020     01/05/2020    K 3.6 01/05/2020     01/05/2020    CO2 33.0 01/05/2020    GLU 89 01/05/2020    CA 7.7 01/05/202

## 2020-01-06 ENCOUNTER — APPOINTMENT (OUTPATIENT)
Dept: GENERAL RADIOLOGY | Facility: HOSPITAL | Age: 75
DRG: 177 | End: 2020-01-06
Attending: INTERNAL MEDICINE
Payer: MEDICARE

## 2020-01-06 PROBLEM — Z71.89 GOALS OF CARE, COUNSELING/DISCUSSION: Status: ACTIVE | Noted: 2020-01-06

## 2020-01-06 LAB
ALBUMIN SERPL-MCNC: 1.5 G/DL (ref 3.4–5)
ANION GAP SERPL CALC-SCNC: 3 MMOL/L (ref 0–18)
BASOPHILS # BLD: 0.51 X10(3) UL (ref 0–0.2)
BASOPHILS NFR BLD: 2 %
BLASTS # BLD: 0.26 X10(3) UL
BLASTS NFR BLD: 1 %
BUN BLD-MCNC: 21 MG/DL (ref 7–18)
BUN/CREAT SERPL: 15.1 (ref 10–20)
CALCIUM BLD-MCNC: 7.4 MG/DL (ref 8.5–10.1)
CHLORIDE SERPL-SCNC: 104 MMOL/L (ref 98–112)
CO2 SERPL-SCNC: 31 MMOL/L (ref 21–32)
CREAT BLD-MCNC: 1.39 MG/DL (ref 0.55–1.02)
DEPRECATED RDW RBC AUTO: 53.9 FL (ref 35.1–46.3)
EOSINOPHIL # BLD: 0 X10(3) UL (ref 0–0.7)
EOSINOPHIL NFR BLD: 0 %
ERYTHROCYTE [DISTWIDTH] IN BLOOD BY AUTOMATED COUNT: 15.1 % (ref 11–15)
GLUCOSE BLD-MCNC: 91 MG/DL (ref 70–99)
HAV IGM SER QL: 2 MG/DL (ref 1.6–2.6)
HCT VFR BLD AUTO: 28.6 % (ref 35–48)
HGB BLD-MCNC: 9 G/DL (ref 12–16)
LYMPHOCYTES NFR BLD: 1.02 X10(3) UL (ref 1–4)
LYMPHOCYTES NFR BLD: 4 %
MCH RBC QN AUTO: 30.5 PG (ref 26–34)
MCHC RBC AUTO-ENTMCNC: 31.5 G/DL (ref 31–37)
MCV RBC AUTO: 96.9 FL (ref 80–100)
METAMYELOCYTES # BLD: 1.02 X10(3) UL
METAMYELOCYTES NFR BLD: 4 %
MONOCYTES # BLD: 1.53 X10(3) UL (ref 0.1–1)
MONOCYTES NFR BLD: 6 %
MORPHOLOGY: NORMAL
MYELOCYTES # BLD: 0.26 X10(3) UL
MYELOCYTES NFR BLD: 1 %
NEUTROPHILS # BLD AUTO: 16.25 X10 (3) UL (ref 1.5–7.7)
NEUTROPHILS NFR BLD: 66 %
NEUTS BAND NFR BLD: 16 %
NEUTS HYPERSEG # BLD: 20.7 X10(3) UL (ref 1.5–7.7)
OSMOLALITY SERPL CALC.SUM OF ELEC: 289 MOSM/KG (ref 275–295)
PHOSPHATE SERPL-MCNC: 2.3 MG/DL (ref 2.5–4.9)
PLATELET # BLD AUTO: 347 10(3)UL (ref 150–450)
PLATELET MORPHOLOGY: NORMAL
POTASSIUM SERPL-SCNC: 4.7 MMOL/L (ref 3.5–5.1)
RBC # BLD AUTO: 2.95 X10(6)UL (ref 3.8–5.3)
SODIUM SERPL-SCNC: 138 MMOL/L (ref 136–145)
TOTAL CELLS COUNTED: 99
WBC # BLD AUTO: 25.3 X10(3) UL (ref 4–11)

## 2020-01-06 PROCEDURE — 71045 X-RAY EXAM CHEST 1 VIEW: CPT | Performed by: INTERNAL MEDICINE

## 2020-01-06 PROCEDURE — 99221 1ST HOSP IP/OBS SF/LOW 40: CPT | Performed by: INTERNAL MEDICINE

## 2020-01-06 NOTE — WOUND PROGRESS NOTE
Pt seen for wound care follow up. Pt was sitting up in bed, on high flow =O2. Pt was given an Isogel mattress with air pump last week for preventative measures per nursing request. Pt was agreeable to skin assessment.  The bilateral heel boots were removed,

## 2020-01-06 NOTE — PLAN OF CARE
Continues on vapotherm but down to 55% w/ some c/o SOB - liter flow adjusted by resp care. Refuses to eat and refuses to get oob. Wound care to see sores on L buttocks.  Incontinent of stool earlier in the day - incontinent care given and aloe vesta and sen Progressing     Problem: SAFETY ADULT - FALL  Goal: Free from fall injury  Description  INTERVENTIONS:  - Assess pt frequently for physical needs  - Identify cognitive and physical deficits and behaviors that affect risk of falls.   - [place] fall precaut Manage/alleviate anxiety  - Monitor for signs/symptoms of CO2 retention  Outcome: Not Progressing     Problem: COPING  Goal: Pt/Family able to verbalize concerns and demonstrate effective coping strategies  Description  INTERVENTIONS:  - Assist patient/fam

## 2020-01-06 NOTE — PROGRESS NOTES
[facility]    Nephrology [facility] Note    Marianne Hi Attending:  Julius Carreon MD       SUBJECTIVE:     No new symptoms today.     PHYSICAL EXAM:     Vital Signs: /52 (BP Location: Right arm)   Pulse 103   Temp 97.1 °F (36.2 °C) (T LYPERCENT 3.5 12/20/2019    MOPERCENT 4.6 12/20/2019    EOPERCENT 0.0 12/20/2019    BAPERCENT 0.1 12/20/2019    NE 12.31 (H) 12/20/2019    LYMABS 0.48 (L) 12/20/2019    MOABSO 0.62 12/20/2019    EOABSO 0.00 12/20/2019    BAABSO 0.02 12/20/2019     Lab R sodium chloride 0.9% 104 mL IVPB, 8 mg, Intravenous, TID PRN  bisacodyl (DULCOLAX) rectal suppository 10 mg, 10 mg, Rectal, Daily PRN  allopurinol (ZYLOPRIM) tab 300 mg, 300 mg, Oral, BID  Normal Saline Flush 0.9 % injection 10 mL, 10 mL, Intravenous, PRN

## 2020-01-06 NOTE — PROGRESS NOTES
[facility] Hospitalist Progress Note     CC: Hospital Follow up    PCP: None Pcp     Assessment/Plan:       Brenna Sees 66year old female with PMH sig- unknown (hasn't seen a doctor in years) nicotine use x years here with SOB and leg swelling, found to have weight 162 lb 9.6 oz (73.8 kg), SpO2 94 %.     Temp:  [97.1 °F (36.2 °C)-98.6 °F (37 °C)] 98.6 °F (37 °C)  Pulse:  [] 96  Resp:  [11-30] 19  BP: ()/(47-55) 104/49  FiO2 (%):  [65 %-80 %] 65 %      Intake/Output:    Intake/Output Summary (Last 24 Recent Labs   Lab 01/02/20  0425 01/03/20  0437 01/04/20  0457 01/05/20  0436 01/06/20  0450   ALB 1.3* 1.7* 1.6* 1.5* 1.5*         Imaging:  Xr Chest Ap Portable  (cpt=71045)    Result Date: 1/5/2020  CONCLUSION:  1.  Stable position of right chest [DISCONTINUED] HYDROcodone-acetaminophen **OR** [DISCONTINUED] HYDROcodone-acetaminophen, ondansetron HCl, influenza virus vaccine PF, hydrALAzine HCl

## 2020-01-06 NOTE — PROGRESS NOTES
[facility]  Progress Note    Korina Groves Patient Status:  Inpatient    9/15/1945 MRN [id number]   Location [facility] 4W/SW/SE Attending Virginia Canela MD   Hosp Day # 23 PCP None Pcp     Subjective:   Worsening respiratory status, considerations bacterial , fungal and TB. 2. Metastatic mediastinal and hilar lymphadenopathy not well evaluated without contrast.  3. Small right greater than left pleural effusions with compressive atelectasis.   4. 4.7 cm aneurysmal dilatation of ascend

## 2020-01-06 NOTE — PROGRESS NOTES
32 [facility] Infectious Disease  Progress Note    Cydney Rosenthal Patient Status:  Inpatient    9/15/1945 MRN [id number]   Location [facility] 2W/SW Attending Eduardo Walton MD   Hosp Day # 23 PCP None Pcp     Subjective:  Patient seen/ex SSLCa  - Patient with SVC syndrome, known RUL/hilar mass  - Repeat CT with necrotizing cavitary pneumonia in RUL concerning for bacterial, fungal, TB  - Recent sputum with pseudomonas x2  - AFB isolation with sputums negative thus far, MTB PCR negative  -

## 2020-01-06 NOTE — PROGRESS NOTES
[facility]    Nephrology Progress Note    Kelvin Mason Attending:  Shellie Crigler, MD       SUBJECTIVE:   Kelvin Mason is feeling about the same today as yesterday. Breathing unchanged.      PHYSICAL EXAM:     Vital Signs: /49 (BP L GFRAA 42 (L) 01/06/2020    CA 7.4 (L) 01/06/2020    OSMOCALC 289 01/06/2020    ALKPHO 60 12/19/2019    AST 19 12/19/2019    ALT 35 12/19/2019    BILT 0.5 12/19/2019    TP 5.2 (L) 12/19/2019    ALB 1.5 (L) 01/06/2020    GLOBULIN 2.3 (L) 12/19/2019     200 mg, 200 mg, Oral, 2 times per day  Midazolam HCl (VERSED) 2 MG/2ML injection 1 mg, 1 mg, Intravenous, Q5 Min PRN  fentaNYL citrate (SUBLIMAZE) 0.05 MG/ML injection 50 mcg, 50 mcg, Intravenous, Q5 Min PRN  Naloxone HCl (NARCAN) 0.4 MG/ML injection 80 mc Failure  - Appears euvolemic presently    #. Acute hypoxemic respiratory failure  - due to lung mass and post obstructive, cavitary, pneumonia. - ID and pulmonary medicine following    #.  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

## 2020-01-06 NOTE — CONSULTS
[address] [facility]  [id number]  Hospital Day #23  Date of Consult: 01/06/20  Patient seen at: [address] [facility] [address]    Reason for Consultation:      Consult requested by Dr Idalia Bryan for eval smoker   History of Substance abuse none    Allergies: No Known Allergies    Medications:       Current Facility-Administered Medications:   •  silver sulfADIAZINE (SILVADENE) 1 % cream, , Topical, BID  •  Heparin Sodium (Porcine) 5000 UNIT/ML injection 5, HYDROcodone-acetaminophen (NORCO) 5-325 MG per tab 2 tablet, 2 tablet, Oral, Q4H PRN  •  ondansetron HCl (ZOFRAN) injection 4 mg, 4 mg, Intravenous, Q6H PRN  •  influenza vaccine (PF)(FLUZONE HD) high dose for 65 yrs & older inj 0.5ml, 0.5 mL, Intramuscula of the right upper lobe. There is stable pattern of complete opacification of the right upper lobe. 2.  Small pleural effusions in the costophrenic sulci. Small bibasilar pulmonary opacities which could represent atelectasis or pneumonia.   These findings Disease  Can’t do any work   Partial Assist Normal or Reduced Full or confused   40 Mainly in bed Extensive Disease  Can’t do any work Mainly Assist Normal or Reduced Full or confused   30 Bedbound Extensive Disease  Can’t do any work Max Assist  Total Car Swelling of lower extremity        Hypoxia        Goals of care, counseling/discussion          Palliative Care Recommendations/Plan:     1. Goals of care discussions. Continue with current medical tx's at this time. No limitations set.  Will sign off as go

## 2020-01-06 NOTE — DIETARY NOTE
ADULT NUTRITION UPDATE NOTE     Recommend: Pt po intake very poor since last visit 1/2. Recommend if feasible,  place feeding tube as pt remains full code and very poor nutrition intake.    NUTRITION DIAGNOSIS/PROBLEM:  Inadequate oral intake related to d 1/2 update: Pt drank x 3 supplements yesterday (=~ 1050 kcals, 60 gm protein) but no food.  Pt NPO today for chest tube today and now to drink Ensure Enlive and asked for X2 ice-cream. Encouraging po intake and if nothing else to drink the supplements to Supplements: chocolate ensure enlive QID  Estimated Nutritional Needs:  Calories: 6293-6935 calories/day (23-25 calories per kg Current wt)  Protein: 75 grams protein/day (1.5 grams protein per kg Ideal body wt (IBW))  - Nutrition Goals:      allow wt loss

## 2020-01-07 LAB
ALBUMIN SERPL-MCNC: 1.5 G/DL (ref 3.4–5)
ANION GAP SERPL CALC-SCNC: 4 MMOL/L (ref 0–18)
BUN BLD-MCNC: 25 MG/DL (ref 7–18)
BUN/CREAT SERPL: 18.7 (ref 10–20)
CALCIUM BLD-MCNC: 7.6 MG/DL (ref 8.5–10.1)
CHLORIDE SERPL-SCNC: 101 MMOL/L (ref 98–112)
CO2 SERPL-SCNC: 34 MMOL/L (ref 21–32)
CREAT BLD-MCNC: 1.34 MG/DL (ref 0.55–1.02)
GLUCOSE BLD-MCNC: 92 MG/DL (ref 70–99)
HAV IGM SER QL: 2.1 MG/DL (ref 1.6–2.6)
OSMOLALITY SERPL CALC.SUM OF ELEC: 292 MOSM/KG (ref 275–295)
PHOSPHATE SERPL-MCNC: 2.7 MG/DL (ref 2.5–4.9)
POTASSIUM SERPL-SCNC: 3.9 MMOL/L (ref 3.5–5.1)
SODIUM SERPL-SCNC: 139 MMOL/L (ref 136–145)

## 2020-01-07 NOTE — PLAN OF CARE
Problem: RESPIRATORY - ADULT  Goal: Achieves optimal ventilation and oxygenation  Description  INTERVENTIONS:  - Assess for changes in respiratory status  - Assess for changes in mentation and behavior  - Position to facilitate oxygenation and minimize r identify coping skills, available support systems and cultural and spiritual values  - Provide emotional support, including active listening and acknowledgement of concerns of patient and caregivers  - Reduce environmental stimuli, as able  - Instruct elbert

## 2020-01-07 NOTE — PLAN OF CARE
Patient remains on vapotherm 35L. Lungs coarse, congested. Patient refusing to eat. MD and dietary aware. Right chest pigtail cath to suction, minimal output noted. Remains in SR. Antibiotics as ordered. No diarrhea noted.   Spoke with daughter via phone an Provide Smoking Cessation handout, if applicable  - Encourage broncho-pulmonary hygiene including cough, deep breathe, Incentive Spirometry  - Assess the need for suctioning and perform as needed  - Assess and instruct to report SOB or any respiratory diff arrhythmias  - Monitor electrolytes and administer replacement therapy as ordered  Outcome: Progressing     Problem: SAFETY ADULT - FALL  Goal: Free from fall injury  Description  INTERVENTIONS:  - Assess pt frequently for physical needs  - Identify cognit

## 2020-01-07 NOTE — PROGRESS NOTES
[facility]    Nephrology Progress Note    Elis Torrez Attending:  Eva Morton MD       SUBJECTIVE:   Elis Torrez is feeling about the same today as yesterday. Breathing unchanged.      PHYSICAL EXAM:     Vital Signs: /51 (BP L 01/07/2020    CA 7.6 (L) 01/07/2020    OSMOCALC 292 01/07/2020    ALKPHO 60 12/19/2019    AST 19 12/19/2019    ALT 35 12/19/2019    BILT 0.5 12/19/2019    TP 5.2 (L) 12/19/2019    ALB 1.5 (L) 01/07/2020    GLOBULIN 2.3 (L) 12/19/2019     01/07/2020 10 mL, Intravenous, PRN  voriconazole (VFEND) tab 200 mg, 200 mg, Oral, 2 times per day  Midazolam HCl (VERSED) 2 MG/2ML injection 1 mg, 1 mg, Intravenous, Q5 Min PRN  fentaNYL citrate (SUBLIMAZE) 0.05 MG/ML injection 50 mcg, 50 mcg, Intravenous, Q5 Min RI stable at 1.3    #. Chronic Diastolic Heart Failure  - Appears euvolemic presently    #. Acute hypoxemic respiratory failure  - due to lung mass and post obstructive, cavitary, pneumonia. - ID and pulmonary medicine following    #.  Small Cell Lung Cancer

## 2020-01-07 NOTE — PROGRESS NOTES
Pulmonary Progress Note     Assessment / Plan:  1. Acute hypoxemic respiratory failure - due to lung mass and postobstructive pneumonia that developed into a large cavitary lesion.  Now with mucus plugging  - cont heated high flow nasal cannula; currently d R>L  Cardiovascular - regular rate and rhythm, no MRG  Abdo - soft, NTND  Ext - no edema  Mental status - interactive and answering questions appropriately    Medications:  Reviewed in EMR    Lab Data:  Reviewed in EMR    Imaging:  I independently visualiz

## 2020-01-07 NOTE — PROGRESS NOTES
[facility] AND [facility]  Progress Note    Jesus Alberto Gonzalez Patient Status:  Inpatient    9/15/1945 MRN [id number]   Location [facility] 4W/SW/SE Attending Ju More MD   Hosp Day # 24 PCP None Pcp     Subjective:   Worsening respiratory status, without contrast.  3. Small right greater than left pleural effusions with compressive atelectasis. 4. 4.7 cm aneurysmal dilatation of ascending aorta. 5. Multivessel coronary artery calcification. 6. Calcified gallstone. 7. Left thyroid nodule.     Ass

## 2020-01-07 NOTE — PLAN OF CARE
Problem: RESPIRATORY - ADULT  Goal: Achieves optimal ventilation and oxygenation  Description  INTERVENTIONS:  - Assess for changes in respiratory status  - Assess for changes in mentation and behavior  - Position to facilitate oxygenation and minimize r for bleeding and/or hematoma  - Assess quality of pulses, skin color and temperature  - Assess for signs of decreased coronary artery perfusion - ex.  Angina  - Evaluate fluid balance, assess for edema, trend weights  Outcome: Progressing  VSS  Goal: Absenc

## 2020-01-07 NOTE — PHYSICAL THERAPY NOTE
Chart reviewed, attempted to see pt for PT/OT co-tx. Pt up in chair, reporting she just received news from the doctor that she has a poor prognosis with weeks to live. She would like time to think about things and politely refused therapy tx today.  D/t new

## 2020-01-07 NOTE — OCCUPATIONAL THERAPY NOTE
Attempted to see pt for OT tx. Pt is on Vapotherm 60% FiO2 40L. Pt declining therapy today stating she received some bad news about her prognosis. Will defer therapy today and continue to follow. RN (Daria) informed.

## 2020-01-07 NOTE — PROGRESS NOTES
Medical chart reviewed  Actual bedside chart reviewed  No documents yet    I sent messages to ICU RN and SW : when family brings LW/HCPOA documents to call us   We will have them scanned to EMR    Will sign off for now as goals of care are established    I

## 2020-01-07 NOTE — PROGRESS NOTES
[facility] Hospitalist Progress Note     CC: Hospital Follow up    PCP: None Pcp     Assessment/Plan:       Yoshi Goldstein 66year old female with PMH sig- unknown (hasn't seen a doctor in years) nicotine use x years here with SOB and leg swelling, found to have rate 21, height 5' 2\" (1.575 m), weight 164 lb 14.5 oz (74.8 kg), SpO2 94 %.     Temp:  [97.6 °F (36.4 °C)-99.3 °F (37.4 °C)] 97.6 °F (36.4 °C)  Pulse:  [] 98  Resp:  [12-31] 21  BP: (107-133)/(55-67) 111/57  FiO2 (%):  [55 %-65 %] 60 %      Intake/O  138 139   K 3.6 4.7 3.9    104 101   CO2 33.0* 31.0 34.0*       Recent Labs   Lab 01/03/20  0437 01/04/20  0457 01/05/20  0436 01/06/20  0450 01/07/20  0425   ALB 1.7* 1.6* 1.5* 1.5* 1.5*         Imaging:  Xr Chest Ap Portable  (cpt=71045) [DISCONTINUED] HYDROcodone-acetaminophen **OR** [DISCONTINUED] HYDROcodone-acetaminophen, ondansetron HCl, influenza virus vaccine PF, hydrALAzine HCl

## 2020-01-07 NOTE — PROGRESS NOTES
32 [facility] Infectious Disease Progress Note    Maxwellricky Zendejas Patient Status:  Inpatient    9/15/1945 MRN [id number]   Location [facility] 2W/SW Attending Jose Curtis MD   Hosp Day # 24 PCP None Pcp     Subjective:  Patient seen and 200-25 MCG/INH inhaler 1 puff, 1 puff, Inhalation, Daily  •  Normal Saline Flush 0.9 % injection 3 mL, 3 mL, Intravenous, PRN  •  acetaminophen (TYLENOL) tab 650 mg, 650 mg, Oral, Q4H PRN **OR** [DISCONTINUED] HYDROcodone-acetaminophen (NORCO) 5-325 MG per CREATSERUM 1.34 01/07/2020    BUN 25 01/07/2020     01/07/2020    K 3.9 01/07/2020     01/07/2020    CO2 34.0 01/07/2020    GLU 92 01/07/2020    CA 7.6 01/07/2020    ALB 1.5 01/07/2020    MG 2.1 01/07/2020    PHOS 2.7 01/07/2020       Radiology

## 2020-01-08 LAB
ALBUMIN SERPL-MCNC: 1.5 G/DL (ref 3.4–5)
ANION GAP SERPL CALC-SCNC: 4 MMOL/L (ref 0–18)
BUN BLD-MCNC: 23 MG/DL (ref 7–18)
BUN/CREAT SERPL: 20 (ref 10–20)
CALCIUM BLD-MCNC: 7.7 MG/DL (ref 8.5–10.1)
CHLORIDE SERPL-SCNC: 101 MMOL/L (ref 98–112)
CO2 SERPL-SCNC: 34 MMOL/L (ref 21–32)
CREAT BLD-MCNC: 1.15 MG/DL (ref 0.55–1.02)
GLUCOSE BLD-MCNC: 98 MG/DL (ref 70–99)
HAV IGM SER QL: 1.8 MG/DL (ref 1.6–2.6)
OSMOLALITY SERPL CALC.SUM OF ELEC: 292 MOSM/KG (ref 275–295)
PHOSPHATE SERPL-MCNC: 2.4 MG/DL (ref 2.5–4.9)
POTASSIUM SERPL-SCNC: 3.7 MMOL/L (ref 3.5–5.1)
SODIUM SERPL-SCNC: 139 MMOL/L (ref 136–145)

## 2020-01-08 PROCEDURE — 99356 PROLONGED SERV,INPATIENT,1ST HR: CPT | Performed by: INTERNAL MEDICINE

## 2020-01-08 PROCEDURE — 99233 SBSQ HOSP IP/OBS HIGH 50: CPT | Performed by: INTERNAL MEDICINE

## 2020-01-08 RX ORDER — MAGNESIUM SULFATE HEPTAHYDRATE 40 MG/ML
2 INJECTION, SOLUTION INTRAVENOUS ONCE
Status: COMPLETED | OUTPATIENT
Start: 2020-01-08 | End: 2020-01-08

## 2020-01-08 RX ORDER — POTASSIUM CHLORIDE 14.9 MG/ML
20 INJECTION INTRAVENOUS ONCE
Status: COMPLETED | OUTPATIENT
Start: 2020-01-08 | End: 2020-01-08

## 2020-01-08 NOTE — PLAN OF CARE
End of shift summary: con't vapotherm 60% 35L, brief episodes of desaturation with repositioning d/t pericare, breath sounds rhonchi bilaterally; all medications administered as prescribed; loose BM x 2; electrolyte replaced per protocol; palliative care r Provide Smoking Cessation handout, if applicable  - Encourage broncho-pulmonary hygiene including cough, deep breathe, Incentive Spirometry  - Assess the need for suctioning and perform as needed  - Assess and instruct to report SOB or any respiratory diff arrhythmias  - Monitor electrolytes and administer replacement therapy as ordered  Outcome: Progressing     Problem: SAFETY ADULT - FALL  Goal: Free from fall injury  Description  INTERVENTIONS:  - Assess pt frequently for physical needs  - Identify cognit

## 2020-01-08 NOTE — PROGRESS NOTES
S: Nurse request to visit patient  O: Patient is Rastafari, non practicing but did ask for Communion and Anointing  A: Patient stated she won't live long. We put the goal of going home as number 1. P: I will follow up with patient daily.  I did put request

## 2020-01-08 NOTE — PHYSICAL THERAPY NOTE
Chart reviewed, attempted to see pt for PT tx. Pt eating breakfast, requesting f/u later today. Will f/u later today for PT tx as appropriate, schedule permitting.

## 2020-01-08 NOTE — OCCUPATIONAL THERAPY NOTE
Nurse approved pt participation in therapy. Pt has breakfast tray. Pt reports needing an extended amount of time to eat. Plan to reschedule.

## 2020-01-08 NOTE — PROGRESS NOTES
[facility] Hospitalist Progress Note     CC: Hospital Follow up    PCP: None Pcp     Assessment/Plan:       June Cabral 66year old female with PMH sig- unknown (hasn't seen a doctor in years) nicotine use x years here with SOB and leg swelling, found to have MD  [facility]  Answering Service: [phone]     Time spent with patient: 35 mins with > 50% spent counseling patient face to face     Subjective:     Breathing feels about the same.  States she is just trying to get through each day, did not want t 9.0*   HCT 31.2* 28.4* 28.6*   MCV 94.5 95.9 96.9   MCH 29.7 30.1 30.5   MCHC 31.4 31.3 31.5   RDW 14.6 14.9 15.1*   NEPRELIM 14.05* 12.60* 16.25*   WBC 25.1* 22.0* 25.3*   .0 306.0 347.0         Recent Labs   Lab 01/06/20  0450 01/07/20  0425 01/08

## 2020-01-08 NOTE — PHYSICAL THERAPY NOTE
PHYSICAL THERAPY TREATMENT NOTE - INPATIENT     Room Number: 641/705-C       Presenting Problem: lung mass new dx     Problem List  Principal Problem:    Lung mass  Active Problems:    Swelling of lower extremity    Hypoxia    Goals of care, counseling/dis Inpatient Basic Mobility Short Form. Research supports that patients with this level of impairment may benefit from rehab stay. Pt was MOD IND with mobility prior to admission, however has declining respiratory status with lung CA dx.  Per chart, pt with p on the side of the bed?: A Little   How much help from another person does the patient currently need. ..   -   Moving to and from a bed to a chair (including a wheelchair)?: A Lot   -   Need to walk in hospital room?: A Lot   -   Climbing 3-5 steps with a

## 2020-01-08 NOTE — PROGRESS NOTES
[facility]  Palliative Care Follow Up    Sujata Hart  [id number]  Hospital Day #25  Date of Consult: 01/06/20  Patient seen at: [facility] AND [facility] Room 209    Subjective:      Re-consulted for [address]      RN talked with family who set up ap discuss with them that my recommendations would be to honor the do not resuscitate code status given that this is the most aggressive form of medical treatments and given her weakened state and poor prognosis, she would have poor QOL in this scenario.     Patricia Santacruz voriconazole (VFEND) tab 200 mg, 200 mg, Oral, 2 times per day  •  Midazolam HCl (VERSED) 2 MG/2ML injection 1 mg, 1 mg, Intravenous, Q5 Min PRN  •  fentaNYL citrate (SUBLIMAZE) 0.05 MG/ML injection 50 mcg, 50 mcg, Intravenous, Q5 Min PRN  •  Naloxone HCl injection 10 mg, 10 mg, Intravenous, Q6H PRN  No current outpatient medications on file.       Labs/ imaging     Hematology:  Lab Results   Component Value Date    WBC 25.3 (H) 01/06/2020    HGB 9.0 (L) 01/06/2020    HCT 28.6 (L) 01/06/2020    .0 01/ Reduced Drowsy/confused   20 Bedbound Extensive Disease  Can’t do any work Max Assist  Total Care Minimal Drowsy/confused   10 Bedbound/coma Extensive Disease  Can’t do any work  coma  Max Assist  Total Care Mouth care Drowsy or coma   0 Death     Palliati

## 2020-01-08 NOTE — PROGRESS NOTES
Dr India Swift called me yesterday for re-consultation for GOCD. She has been talking directly with Hortencia Wheeler about cancer diagnosis and prognosis.   Patient wishes to talk with me herself  We talked about information in EMR as well as family's wishes    Fa

## 2020-01-08 NOTE — PROGRESS NOTES
[facility]    Nephrology Progress Note    Erick Dougherty Attending:  Carlos Otero MD       SUBJECTIVE:   Erick Dougherty feels about the same today as yesterday. Net negative approx 1 L since 1/6.     PHYSICAL EXAM:     Vital Signs: / 01/08/2020    CA 7.7 (L) 01/08/2020    OSMOCALC 292 01/08/2020    ALKPHO 60 12/19/2019    AST 19 12/19/2019    ALT 35 12/19/2019    BILT 0.5 12/19/2019    TP 5.2 (L) 12/19/2019    ALB 1.5 (L) 01/08/2020    GLOBULIN 2.3 (L) 12/19/2019     01/08/2020 6:42.     INDICATIONS:  Follow up chest tube. Right upper lobe abscess. TECHNIQUE:    Single view. FINDINGS:          CARDIAC/VASC:           Normal.  No cardiac silhouette abnormality or cardiomegaly. Unremarkable pulmonary vasculature.      ME oral solution 125 mg, 125 mg, Oral, Daily  Metoclopramide HCl (REGLAN) injection 5 mg, 5 mg, Intravenous, Q8H PRN  ipratropium-albuterol (DUONEB) nebulizer solution 3 mL, 3 mL, Nebulization, Q4H PRN  ondansetron HCl (ZOFRAN) 8 mg in sodium chloride 0.9% 10 [phone]

## 2020-01-08 NOTE — PROGRESS NOTES
Pulmonary Progress Note     Assessment / Plan:  1. Acute hypoxemic respiratory failure - due to lung mass and postobstructive pneumonia that developed into a large cavitary lesion.  Now with mucus plugging  - cont heated high flow nasal cannula; currently d appropriately    Medications:  Reviewed in EMR    Lab Data:  Reviewed in EMR    Imaging:  I independently visualized all relevant chest imaging in PACS and agree with radiology interpretation except where noted.

## 2020-01-08 NOTE — PROGRESS NOTES
32 [facility] Infectious Disease  Progress Note    Chris Lagabel Patient Status:  Inpatient    9/15/1945 MRN [id number]   Location [facility] 2W/SW Attending Quan Mcdowell MD   Hosp Day # 25 PCP None Pcp     Subjective:  Patient s TB  - Recent sputum with pseudomonas x2  - AFB isolation with sputums negative thus far, MTB PCR negative  - S/P CT guided pigtail chest tube drainage of abscess on 1/2 - cultures again with pseudomonas  - Fungal antibodies negative, aspergillus galactoman

## 2020-01-08 NOTE — PLAN OF CARE
Problem: Patient Centered Care  Goal: Patient preferences are identified and integrated in the patient's plan of care  Description  Interventions:  - What would you like us to know as we care for you? I haven't been to a doctor in a long time.   I like to by Vero Martínez, RN  Outcome: Progressing     Problem: SAFETY ADULT - FALL  Goal: Free from fall injury  Description  INTERVENTIONS:  - Assess pt frequently for physical needs  - Identify cognitive and physical deficits and behaviors that affect risk oxygen saturation or ABGs  - Provide Smoking Cessation handout, if applicable  - Encourage broncho-pulmonary hygiene including cough, deep breathe, Incentive Spirometry  - Assess the need for suctioning and perform as needed  - Assess and instruct to repor discharge planning if the patient needs post-hospital services based on physician/LIP order or complex needs related to functional status, cognitive ability or social support system  1/8/2020 1621 by Hussein Shi RN  Outcome: Not Progressing  1/8/202

## 2020-01-09 ENCOUNTER — APPOINTMENT (OUTPATIENT)
Dept: CT IMAGING | Facility: HOSPITAL | Age: 75
DRG: 177 | End: 2020-01-09
Attending: INTERNAL MEDICINE
Payer: MEDICARE

## 2020-01-09 LAB
ALBUMIN SERPL-MCNC: 1.6 G/DL (ref 3.4–5)
ANION GAP SERPL CALC-SCNC: 7 MMOL/L (ref 0–18)
BUN BLD-MCNC: 22 MG/DL (ref 7–18)
BUN/CREAT SERPL: 19.5 (ref 10–20)
CALCIUM BLD-MCNC: 7.6 MG/DL (ref 8.5–10.1)
CHLORIDE SERPL-SCNC: 100 MMOL/L (ref 98–112)
CO2 SERPL-SCNC: 30 MMOL/L (ref 21–32)
CREAT BLD-MCNC: 1.13 MG/DL (ref 0.55–1.02)
DEPRECATED RDW RBC AUTO: 52 FL (ref 35.1–46.3)
ERYTHROCYTE [DISTWIDTH] IN BLOOD BY AUTOMATED COUNT: 14.9 % (ref 11–15)
GLUCOSE BLD-MCNC: 95 MG/DL (ref 70–99)
HAV IGM SER QL: 2 MG/DL (ref 1.6–2.6)
HCT VFR BLD AUTO: 28.8 % (ref 35–48)
HGB BLD-MCNC: 9.1 G/DL (ref 12–16)
MCH RBC QN AUTO: 30 PG (ref 26–34)
MCHC RBC AUTO-ENTMCNC: 31.6 G/DL (ref 31–37)
MCV RBC AUTO: 95 FL (ref 80–100)
OSMOLALITY SERPL CALC.SUM OF ELEC: 287 MOSM/KG (ref 275–295)
PHOSPHATE SERPL-MCNC: 2.1 MG/DL (ref 2.5–4.9)
PLATELET # BLD AUTO: 337 10(3)UL (ref 150–450)
POTASSIUM SERPL-SCNC: 3.8 MMOL/L (ref 3.5–5.1)
RBC # BLD AUTO: 3.03 X10(6)UL (ref 3.8–5.3)
SODIUM SERPL-SCNC: 137 MMOL/L (ref 136–145)
WBC # BLD AUTO: 26 X10(3) UL (ref 4–11)

## 2020-01-09 PROCEDURE — 71250 CT THORAX DX C-: CPT | Performed by: INTERNAL MEDICINE

## 2020-01-09 PROCEDURE — 99231 SBSQ HOSP IP/OBS SF/LOW 25: CPT | Performed by: INTERNAL MEDICINE

## 2020-01-09 RX ORDER — POTASSIUM CHLORIDE 14.9 MG/ML
20 INJECTION INTRAVENOUS ONCE
Status: COMPLETED | OUTPATIENT
Start: 2020-01-09 | End: 2020-01-09

## 2020-01-09 RX ORDER — POTASSIUM CHLORIDE 1.5 G/1.77G
40 POWDER, FOR SOLUTION ORAL ONCE
Status: DISCONTINUED | OUTPATIENT
Start: 2020-01-09 | End: 2020-01-09

## 2020-01-09 NOTE — BH PROGRESS NOTE
Behavioral Health Note:  CHIEF COMPLAINT:   Swelling, lung mass     REASON FOR ADMISSION:   Swelling, lung mass     SOCIAL HISTORY:  Colby Munson) lives at her home in [address] with her daughter, ESPERANZA, and granddaughter living in her upstairs unit.  She her dx means for her long term but feels these sx have since subsided.      Akosua reports mild decline in sleep quality, getting 6-8 hours per night with occasional disturbances and difficulty falling asleep but wasn't able to specify how often \"occasional

## 2020-01-09 NOTE — PROGRESS NOTES
Pulmonary Progress Note     Assessment / Plan:  1. Acute hypoxemic respiratory failure - due to lung mass and postobstructive pneumonia that developed into a large cavitary lesion. Now with mucus plugging  - cont heated high flow nasal cannula.  Wean as abl in EMR    Imaging:  I independently visualized all relevant chest imaging in PACS and agree with radiology interpretation except where noted.

## 2020-01-09 NOTE — PROGRESS NOTES
[facility]  Palliative Care Follow Up    Amado Evans  [id number]  Hospital Day #26  Date of Consult: 01/06/20  Patient seen at: [facility] Room 209    Subjective:      Medical chart reviewed  I d/w'd her ICU RN about our [address] yes [facility]) 50 MG/ML oral solution 125 mg, 125 mg, Oral, Daily  •  Metoclopramide HCl (REGLAN) injection 5 mg, 5 mg, Intravenous, Q8H PRN  •  ipratropium-albuterol (DUONEB) nebulizer solution 3 mL, 3 mL, Nebulization, Q4H PRN  •  ondansetron HCl (ZOFRAN) 8 m 12/19/2019    AST 19 12/19/2019    ALT 35 12/19/2019    MG 2.0 01/09/2020    PHOS 2.1 (L) 01/09/2020       Albumin (g/dL)   Date Value   01/09/2020 1.6 (L)   01/08/2020 1.5 (L)   01/07/2020 1.5 (L)   01/06/2020 1.5 (L)   01/05/2020 1.5 (L)   01/04/2020 1.6 Marline Andrewss wishes to handle this gingerly with her mom first.    Advance Care Planning counseling and discussion:    POLST/CODE STATUS: FULL CODE STATUS    HCPOA:        Healthcare Agent Appointed: Yes  Healthcare Agent's Name: Josie Georges her daughter  Healthcare

## 2020-01-09 NOTE — PROGRESS NOTES
32 [facility] Infectious Disease Progress Note    Elis Torrez Patient Status:  Inpatient    9/15/1945 MRN [id number]   Location [facility] 2W/SW Attending Jason El MD   Hosp Day # 26 PCP None Pcp     Subjective:  Patient se ELLIPTA) 200-25 MCG/INH inhaler 1 puff, 1 puff, Inhalation, Daily  •  Normal Saline Flush 0.9 % injection 3 mL, 3 mL, Intravenous, PRN  •  acetaminophen (TYLENOL) tab 650 mg, 650 mg, Oral, Q4H PRN **OR** [DISCONTINUED] HYDROcodone-acetaminophen (NORCO) 5-3 WBC 26.0 01/09/2020    HGB 9.1 01/09/2020    HCT 28.8 01/09/2020    .0 01/09/2020    CREATSERUM 1.13 01/09/2020    BUN 22 01/09/2020     01/09/2020    K 3.8 01/09/2020     01/09/2020    CO2 30.0 01/09/2020    GLU 95 01/09/2020    CA 7.6   Diarrhea:C.diff negative     5.  Disposition - inpatient.  Continue broad spectrum antibiotics.  Cavitation appears more c/w infectious process - bacterial, fungal, AFB all considerations but cultures only with pseudomonas.  Continue zosyn and voriconazo

## 2020-01-09 NOTE — PROGRESS NOTES
S: Patient is on my follow up  O: Patient was alert and sitting in chair. Carl Mathias just left the room, she was anointed.    A: Patient didn't want prayer and stated she will ask for me if she wants me back  P: I will follow up when called      01/09/20 6481

## 2020-01-09 NOTE — PLAN OF CARE
Depression ongoing on pt's overall condition. Pt awake, oriented, cooperative, verbal, flat affect, poor appetitite, [facility] liason notified and saw pt, waiting for recommendation. Remains on vapotherm now at 60%, up in chair via lift, sat 89-90%, other vss. respiratory effort  - Oxygen supplementation based on oxygen saturation or ABGs  - Provide Smoking Cessation handout, if applicable  - Encourage broncho-pulmonary hygiene including cough, deep breathe, Incentive Spirometry  - Assess the need for suctioning control medications as ordered  - Initiate emergency measures for life threatening arrhythmias  - Monitor electrolytes and administer replacement therapy as ordered  Outcome: Progressing     Problem: SAFETY ADULT - FALL  Goal: Free from fall injury  Descri

## 2020-01-09 NOTE — PLAN OF CARE
End of shift summary: continue oxygenation with vapotherm 50% 30L; breath sounds rhonchi; productive cough tan sputum; con't desaturation with repositioning; wound care provided, protective cream utilized in perineal area; will continue with current plan o ABGs  - Provide Smoking Cessation handout, if applicable  - Encourage broncho-pulmonary hygiene including cough, deep breathe, Incentive Spirometry  - Assess the need for suctioning and perform as needed  - Assess and instruct to report SOB or any respirat threatening arrhythmias  - Monitor electrolytes and administer replacement therapy as ordered  Outcome: Progressing     Problem: SAFETY ADULT - FALL  Goal: Free from fall injury  Description  INTERVENTIONS:  - Assess pt frequently for physical needs  - Kenyetta Rapp

## 2020-01-09 NOTE — PROGRESS NOTES
[facility]    Nephrology Progress Note    Jesus Alberto Gonzalez Attending:  Chilo Saul MD       SUBJECTIVE:   Jesus Alberto Carlos states her breathing is a little worse today than yesterday.  Has just returned from repeat CT chest.     PHYSICAL EXAM (L) 01/09/2020    GFRAA 55 (L) 01/09/2020    CA 7.6 (L) 01/09/2020    OSMOCALC 287 01/09/2020    ALKPHO 60 12/19/2019    AST 19 12/19/2019    ALT 35 12/19/2019    BILT 0.5 12/19/2019    TP 5.2 (L) 12/19/2019    ALB 1.6 (L) 01/09/2020    GLOBULIN 2.3 (L) 12 BID  Heparin Sodium (Porcine) 5000 UNIT/ML injection 5,000 Units, 5,000 Units, Subcutaneous, Q8H [facility]  Normal Saline Flush 0.9 % injection 10 mL, 10 mL, Intravenous, PRN  voriconazole (VFEND) tab 200 mg, 200 mg, Oral, 2 times per day  Midazolam HCl (VERSED) acute tubular injury in the setting of cisplatin chemotherapy, diuretics, hypotension, and pneumonia  - Baseline creatinine around 0.9  - Improved, creatinine 1.13 this morning. #. Chronic Diastolic Heart Failure    #.  Acute hypoxemic respiratory failu

## 2020-01-09 NOTE — OCCUPATIONAL THERAPY NOTE
OCCUPATIONAL THERAPY Re-Evaluation NOTE - INPATIENT        Room Number: 062/277-R                Problem List  Principal Problem:    Lung mass  Active Problems:    Swelling of lower extremity    Hypoxia    Goals of care, counseling/discussion      OCCUPATI simplification techniques;ADL training;Functional transfer training; Endurance training;Patient/Family education;Patient/Family training    SUBJECTIVE  Agreeable to activity     OBJECTIVE  Precautions: Chest tube; Other (Comment)(gaitan, x2 monitor, 10L HF va MI   Comment:                  Goals  on: 2020  Frequency: 3x week        OT GOALS  Patients self stated goal is: to go home      Patient will complete functional transfer with MI   Comment: currently not met ;  Max A     Patient will comp

## 2020-01-09 NOTE — PHYSICAL THERAPY NOTE
PHYSICAL THERAPY TREATMENT NOTE - INPATIENT     Room Number: 554/421-B       Presenting Problem: lung mass new dx     Problem List  Principal Problem:    Lung mass  Active Problems:    Swelling of lower extremity    Hypoxia    Goals of care, counseling/dis tx    OBJECTIVE  Precautions: Chest tube(X2 monitor, 30L HF vapotherm)    WEIGHT BEARING RESTRICTION  Weight Bearing Restriction: None    PAIN ASSESSMENT   Ratin  Location: N/A  Management Techniques:  Activity promotion;Relaxation;Repositioning    JADEN all lines in place)    CURRENT GOALS   Goals to be met by: 1/10/20  Patient Goal Patient's self-stated goal is: to go home   Goal #1 Patient is able to demonstrate supine - sit EOB @ level: supervision      Goal #1   Current Status Pt performs bed mobility

## 2020-01-09 NOTE — PROGRESS NOTES
[facility] Hospitalist Progress Note     CC: Hospital Follow up    PCP: None Pcp     Assessment/Plan:       Dorothy Juanita 66year old female with PMH sig- unknown (hasn't seen a doctor in years) nicotine use x years here with SOB and leg swelling, found to have -586    HTN   -  amlodipine on hold    Diarrhea  - c diff negative  - anti-diarrheal agents     GOC  - CODE- FULL  - POA- daughter Macho Sickle  - palliative care consulted, plan for family meeting this afternoon     Dispo - ICU, palliative following, famil 163 lb (73.9 kg)  12/14/19 1522 : 170 lb (77.1 kg)    Exam  GEN: nad, weak throughout, flat affect, no respiratory distress  HEENT: EOMI  Neck: Supple   Pulm: no wheezing, diffuse rhonchi b/l, poor inspiratory effort  CV: RRR, no murmurs   ABD: Soft, non-t atelectasis. 4. Stable the ascending and descending thoracic aortic aneurysm.     Dictated by (CST): Uma Quintanilla MD on 1/09/2020 at 10:26     Approved by (CST): Uma Quintanilla MD on 1/09/2020 at 10:58              Meds:     • silver sulfADIAZINE

## 2020-01-10 LAB
ALBUMIN SERPL-MCNC: 1.5 G/DL (ref 3.4–5)
ANION GAP SERPL CALC-SCNC: 2 MMOL/L (ref 0–18)
BUN BLD-MCNC: 18 MG/DL (ref 7–18)
BUN/CREAT SERPL: 15.7 (ref 10–20)
CALCIUM BLD-MCNC: 7.8 MG/DL (ref 8.5–10.1)
CHLORIDE SERPL-SCNC: 100 MMOL/L (ref 98–112)
CO2 SERPL-SCNC: 35 MMOL/L (ref 21–32)
CREAT BLD-MCNC: 1.15 MG/DL (ref 0.55–1.02)
DEPRECATED RDW RBC AUTO: 51.7 FL (ref 35.1–46.3)
ERYTHROCYTE [DISTWIDTH] IN BLOOD BY AUTOMATED COUNT: 15 % (ref 11–15)
GLUCOSE BLD-MCNC: 98 MG/DL (ref 70–99)
HAV IGM SER QL: 2 MG/DL (ref 1.6–2.6)
HCT VFR BLD AUTO: 27.5 % (ref 35–48)
HGB BLD-MCNC: 8.6 G/DL (ref 12–16)
MCH RBC QN AUTO: 29.7 PG (ref 26–34)
MCHC RBC AUTO-ENTMCNC: 31.3 G/DL (ref 31–37)
MCV RBC AUTO: 94.8 FL (ref 80–100)
OSMOLALITY SERPL CALC.SUM OF ELEC: 286 MOSM/KG (ref 275–295)
PHOSPHATE SERPL-MCNC: 2.1 MG/DL (ref 2.5–4.9)
PLATELET # BLD AUTO: 309 10(3)UL (ref 150–450)
POTASSIUM SERPL-SCNC: 4.1 MMOL/L (ref 3.5–5.1)
RBC # BLD AUTO: 2.9 X10(6)UL (ref 3.8–5.3)
SODIUM SERPL-SCNC: 137 MMOL/L (ref 136–145)
WBC # BLD AUTO: 21.8 X10(3) UL (ref 4–11)

## 2020-01-10 RX ORDER — VORICONAZOLE 40 MG/ML
200 POWDER, FOR SUSPENSION ORAL EVERY 12 HOURS SCHEDULED
Status: DISCONTINUED | OUTPATIENT
Start: 2020-01-10 | End: 2020-01-20

## 2020-01-10 NOTE — PROGRESS NOTES
[facility]    Nephrology Progress Note      SUBJECTIVE:     Still requiring high amounts of oxygen, reports breathing is so-so   Appetite is decreased        PHYSICAL EXAM:     Vital Signs: /48 (BP Location: Right arm)   Pulse 91   Te 01/06/2020    MON 6 01/06/2020    EOS 0 01/06/2020    BASO 2 01/06/2020    NEPERCENT 90.6 12/20/2019    LYPERCENT 3.5 12/20/2019    MOPERCENT 4.6 12/20/2019    EOPERCENT 0.0 12/20/2019    BAPERCENT 0.1 12/20/2019    NE 12.31 (H) 12/20/2019    LYMABS 0.48 ( Intravenous, Q8H PRN  ipratropium-albuterol (DUONEB) nebulizer solution 3 mL, 3 mL, Nebulization, Q4H PRN  ondansetron HCl (ZOFRAN) 8 mg in sodium chloride 0.9% 104 mL IVPB, 8 mg, Intravenous, TID PRN  bisacodyl (DULCOLAX) rectal suppository 10 mg, 10 mg,

## 2020-01-10 NOTE — PROGRESS NOTES
Pulmonary Progress Note     Assessment / Plan:  1. Acute hypoxemic respiratory failure - due to lung mass and postobstructive pneumonia that developed into a large cavitary lesion. Now with mucus plugging  - cont heated high flow nasal cannula.  Wean as abl - soft, NTND  Ext - no edema  Mental status - interactive and answering questions appropriately    Medications:  Reviewed in EMR    Lab Data:  Reviewed in EMR    Imaging:  I independently visualized all relevant chest imaging in PACS and agree with radiolo

## 2020-01-10 NOTE — PROGRESS NOTES
[facility] Hospitalist Progress Note     CC: Hospital Follow up    PCP: None Pcp     Assessment/Plan:       Carlitos Valencia 66year old female with PMH sig- unknown (hasn't seen a doctor in years) nicotine use x years here with SOB and leg swelling, found to have CHF  - echo w/ diastolic dysfunction and moderate AR, off lasix, -586    HTN   -  amlodipine on hold    Diarrhea  - c diff negative  - anti-diarrheal agents     GOC  - CODE- FULL  - POA- daughter Kevan Mena  - palliative care consulted, s/p family ja flat affect, no respiratory distress   HEENT: EOMI  Neck: Supple   Pulm: diffuse rhonchi b/l, poor inspiratory effort  CV: RRR, no murmurs   ABD: Soft, non-tender, non-distended, +BS  Neuro: Grossly normal, CN intact, sensory intact  Psych: Affect- flat  S ascending and descending thoracic aortic aneurysm.     Dictated by (CST): Maximo Bryant MD on 1/09/2020 at 10:26     Approved by (CST): Maximo Bryant MD on 1/09/2020 at 10:58              Meds:     • Voriconazole  200 mg Oral 2 times per day   • mal

## 2020-01-10 NOTE — PROGRESS NOTES
[facility] Infectious Disease Progress Note    Yoshi Goldstein Patient Status:  Inpatient    9/15/1945 MRN [id number]   Location [facility] 2W/SW Attending Emigdio Potts, [address] [facility] Day # 27 PCP None Pcp     Subjective:  Pt lethargi injection 3 mL, 3 mL, Intravenous, PRN  •  acetaminophen (TYLENOL) tab 650 mg, 650 mg, Oral, Q4H PRN **OR** [DISCONTINUED] HYDROcodone-acetaminophen (NORCO) 5-325 MG per tab 1 tablet, 1 tablet, Oral, Q4H PRN **OR** [DISCONTINUED] HYDROcodone-acetaminophen 01/10/2020     01/10/2020    CO2 35.0 01/10/2020    GLU 98 01/10/2020    CA 7.8 01/10/2020    ALB 1.5 01/10/2020    MG 2.0 01/10/2020    PHOS 2.1 01/10/2020         Assessment/Plan:    1.   PNA  -on HFNC  -CT with necrotizing cavitary PNA of RUL  -spu

## 2020-01-10 NOTE — PROGRESS NOTES
Medical chart reviewed    I called daughter, Memo Dowell, this AM  Her  returned my phone call stating that Memo Dowell is not able to talk with me as she is very emotionally overwhelmed to talk about all of this right now.     I explained that it is important both her daughter and her ESPERANZA present.     If [facility] clinically declines and is unable to participate in meaningful goals of care discussions then I strongly recommend a multi-disciplinary meeting which includes hospitalist, pulmonologist, nurse, and if

## 2020-01-10 NOTE — DIETARY NOTE
ADULT NUTRITION UPDATE NOTE 1/10. Recommend: Pt po intake remains very poor since last visit 1/2 (8 days now). Recommend if feasible, attempt small bore feeding tube placmeent if pt agrees as pt remains full code and very poor nutrition intake.    NUT continued aggressive treatment plan and upcoming chemo planned. 1/2 update: Pt drank x 3 supplements yesterday (=~ 1050 kcals, 60 gm protein) but no food.  Pt NPO today for chest tube today and now to drink Ensure Enlive and asked for X2 ice-cream. Encour 2. 1* 2.1*   OSMOCALC [phone]       NUTRITION PRESCRIPTION:  Diet: Regular/General  Oral Supplements: on hold as not taking.    Estimated Nutritional Needs:  Calories: 5891-2318 calories/day (23-25 calories per kg Current wt)  Protein: 75 grams protein/d

## 2020-01-10 NOTE — PLAN OF CARE
End of shift summary: pt rested comfortable, saturation maintained >94% on vapotherm 60%; 2 loose stools; VSS; did eat a little more dinner compared to previous evening; psych consult f/u later today; pig tail chest output 12 mL; will continue with current applicable  - Encourage broncho-pulmonary hygiene including cough, deep breathe, Incentive Spirometry  - Assess the need for suctioning and perform as needed  - Assess and instruct to report SOB or any respiratory difficulty  - Respiratory Therapy support administer replacement therapy as ordered  Outcome: Progressing     Problem: SAFETY ADULT - FALL  Goal: Free from fall injury  Description  INTERVENTIONS:  - Assess pt frequently for physical needs  - Identify cognitive and physical deficits and behaviors

## 2020-01-11 LAB
ALBUMIN SERPL-MCNC: 1.4 G/DL (ref 3.4–5)
ANION GAP SERPL CALC-SCNC: 5 MMOL/L (ref 0–18)
BUN BLD-MCNC: 17 MG/DL (ref 7–18)
BUN/CREAT SERPL: 17.5 (ref 10–20)
CALCIUM BLD-MCNC: 7.4 MG/DL (ref 8.5–10.1)
CHLORIDE SERPL-SCNC: 102 MMOL/L (ref 98–112)
CO2 SERPL-SCNC: 29 MMOL/L (ref 21–32)
CREAT BLD-MCNC: 0.97 MG/DL (ref 0.55–1.02)
DEPRECATED RDW RBC AUTO: 50.9 FL (ref 35.1–46.3)
ERYTHROCYTE [DISTWIDTH] IN BLOOD BY AUTOMATED COUNT: 15.1 % (ref 11–15)
GLUCOSE BLD-MCNC: 98 MG/DL (ref 70–99)
HAV IGM SER QL: 1.8 MG/DL (ref 1.6–2.6)
HCT VFR BLD AUTO: 27 % (ref 35–48)
HGB BLD-MCNC: 8.7 G/DL (ref 12–16)
MCH RBC QN AUTO: 30 PG (ref 26–34)
MCHC RBC AUTO-ENTMCNC: 32.2 G/DL (ref 31–37)
MCV RBC AUTO: 93.1 FL (ref 80–100)
OSMOLALITY SERPL CALC.SUM OF ELEC: 284 MOSM/KG (ref 275–295)
PHOSPHATE SERPL-MCNC: 1.9 MG/DL (ref 2.5–4.9)
PLATELET # BLD AUTO: 308 10(3)UL (ref 150–450)
POTASSIUM SERPL-SCNC: 4 MMOL/L (ref 3.5–5.1)
RBC # BLD AUTO: 2.9 X10(6)UL (ref 3.8–5.3)
SODIUM SERPL-SCNC: 136 MMOL/L (ref 136–145)
WBC # BLD AUTO: 22.6 X10(3) UL (ref 4–11)

## 2020-01-11 NOTE — PLAN OF CARE
Problem: Patient Centered Care  Goal: Patient preferences are identified and integrated in the patient's plan of care  Description  Interventions:  - What would you like us to know as we care for you? I haven't been to a doctor in a long time.   I like to Manage/alleviate anxiety  - Monitor for signs/symptoms of CO2 retention  Outcome: Progressing     Problem: COPING  Goal: Pt/Family able to verbalize concerns and demonstrate effective coping strategies  Description  INTERVENTIONS:  - Assist patient/family of falls.   - [place] fall precautions as indicated by assessment.  - Educate pt/family on patient safety including physical limitations  - Instruct pt to call for assistance with activity based on assessment  - Modify environment to reduce risk of injury

## 2020-01-11 NOTE — PLAN OF CARE
Problem: Patient Centered Care  Goal: Patient preferences are identified and integrated in the patient's plan of care  Description  Interventions:  - What would you like us to know as we care for you? I haven't been to a doctor in a long time.   I like to Manage/alleviate anxiety  - Monitor for signs/symptoms of CO2 retention  Outcome: Progressing     Problem: COPING  Goal: Pt/Family able to verbalize concerns and demonstrate effective coping strategies  Description  INTERVENTIONS:  - Assist patient/family of falls.   - [place] fall precautions as indicated by assessment.  - Educate pt/family on patient safety including physical limitations  - Instruct pt to call for assistance with activity based on assessment  - Modify environment to reduce risk of injury

## 2020-01-11 NOTE — PROGRESS NOTES
[facility] Hospitalist Progress Note     CC: Hospital Follow up    PCP: None Pcp     Assessment/Plan:       June Cabral 66year old female with PMH sig- unknown (hasn't seen a doctor in years) nicotine use x years here with SOB and leg swelling, found to have CHF  - echo w/ diastolic dysfunction and moderate AR, off lasix, -586    HTN   -  amlodipine on hold    Diarrhea  - c diff negative  - anti-diarrheal agents     GOC  - CODE- FULL  - POA- daughter Liza Lopez  - palliative care consulted, s/p family ja 1522 : 170 lb (77.1 kg)    Exam  GEN: female, more awake today, nad, weak throughout, flat affect, no respiratory distress   HEENT: EOMI  Neck: Supple   Pulm: diffuse rhonchi b/l, poor inspiratory effort  CV: RRR, no murmurs   ABD: Soft, non-tender, non-di related to areas of pneumonia and/or compressive atelectasis. 4. Stable the ascending and descending thoracic aortic aneurysm.     Dictated by (CST): Rolando Evans MD on 1/09/2020 at 10:26     Approved by (CST): Rolando Evans MD on 1/09/2020 at 10:5

## 2020-01-11 NOTE — PROGRESS NOTES
Handoff report given to receiving RN at bedside, all questions answered. Patient resting comfortably in bed, no acute distress noted. Patient sheets clean, dry. Call light within reach, three side rails up, bed is locked in lowest position.  Double RN

## 2020-01-11 NOTE — PROGRESS NOTES
[address] [facility] Patient Status:  Inpatient    9/15/1945 MRN [id number]   Location [facility] 2W/SW Attending Zeina Fritz MD   [facility] Day # 29 PCP None Pcp     Critical Care Progress Note     Date of Admission: 2019 Normal Saline Flush 0.9 % injection 10 mL, 10 mL, Intravenous, PRN  •  Fluticasone Furoate-Vilanterol (BREO ELLIPTA) 200-25 MCG/INH inhaler 1 puff, 1 puff, Inhalation, Daily  •  Normal Saline Flush 0.9 % injection 3 mL, 3 mL, Intravenous, PRN  •  acetamino 01/11/2020     01/11/2020    CO2 29.0 01/11/2020    BUN 17 01/11/2020    CREATSERUM 0.97 01/11/2020    GLU 98 01/11/2020    CA 7.4 01/11/2020    ALB 1.4 01/11/2020     Lab Results   Component Value Date    INR 0.94 12/16/2019           Imaging: CT ch

## 2020-01-11 NOTE — PROGRESS NOTES
[facility]    Nephrology Progress Note      SUBJECTIVE:     Appetite is still decreased   Breathing is better today      PHYSICAL EXAM:     Vital Signs: /56 (BP Location: Right arm)   Pulse 96   Temp 97 °F (36.1 °C) (Temporal)   Resp BASO 2 01/06/2020    NEPERCENT 90.6 12/20/2019    LYPERCENT 3.5 12/20/2019    MOPERCENT 4.6 12/20/2019    EOPERCENT 0.0 12/20/2019    BAPERCENT 0.1 12/20/2019    NE 12.31 (H) 12/20/2019    LYMABS 0.48 (L) 12/20/2019    MOABSO 0.62 12/20/2019    EOABSO 0.00 (REGLAN) injection 5 mg, 5 mg, Intravenous, Q8H PRN  ipratropium-albuterol (DUONEB) nebulizer solution 3 mL, 3 mL, Nebulization, Q4H PRN  ondansetron HCl (ZOFRAN) 8 mg in sodium chloride 0.9% 104 mL IVPB, 8 mg, Intravenous, TID PRN  bisacodyl (DULCOLAX) re

## 2020-01-11 NOTE — PLAN OF CARE
Problem: Patient Centered Care  Goal: Patient preferences are identified and integrated in the patient's plan of care  Description  Interventions:  - What would you like us to know as we care for you? I haven't been to a doctor in a long time.   I like to puncture sites for bleeding and/or hematoma  - Assess quality of pulses, skin color and temperature  - Assess for signs of decreased coronary artery perfusion - ex.  Angina  - Evaluate fluid balance, assess for edema, trend weights  Outcome: Progressing  Go

## 2020-01-12 ENCOUNTER — APPOINTMENT (OUTPATIENT)
Dept: ULTRASOUND IMAGING | Facility: HOSPITAL | Age: 75
DRG: 177 | End: 2020-01-12
Attending: INTERNAL MEDICINE
Payer: MEDICARE

## 2020-01-12 LAB
ALBUMIN SERPL-MCNC: 1.5 G/DL (ref 3.4–5)
ANION GAP SERPL CALC-SCNC: 3 MMOL/L (ref 0–18)
BUN BLD-MCNC: 16 MG/DL (ref 7–18)
BUN/CREAT SERPL: 16.7 (ref 10–20)
CALCIUM BLD-MCNC: 7.5 MG/DL (ref 8.5–10.1)
CHLORIDE SERPL-SCNC: 102 MMOL/L (ref 98–112)
CO2 SERPL-SCNC: 33 MMOL/L (ref 21–32)
CREAT BLD-MCNC: 0.96 MG/DL (ref 0.55–1.02)
DEPRECATED RDW RBC AUTO: 51.8 FL (ref 35.1–46.3)
ERYTHROCYTE [DISTWIDTH] IN BLOOD BY AUTOMATED COUNT: 15.1 % (ref 11–15)
GLUCOSE BLD-MCNC: 95 MG/DL (ref 70–99)
HAV IGM SER QL: 1.9 MG/DL (ref 1.6–2.6)
HCT VFR BLD AUTO: 27.9 % (ref 35–48)
HGB BLD-MCNC: 8.8 G/DL (ref 12–16)
MCH RBC QN AUTO: 29.9 PG (ref 26–34)
MCHC RBC AUTO-ENTMCNC: 31.5 G/DL (ref 31–37)
MCV RBC AUTO: 94.9 FL (ref 80–100)
OSMOLALITY SERPL CALC.SUM OF ELEC: 287 MOSM/KG (ref 275–295)
PHOSPHATE SERPL-MCNC: 3.1 MG/DL (ref 2.5–4.9)
PLATELET # BLD AUTO: 292 10(3)UL (ref 150–450)
POTASSIUM SERPL-SCNC: 4.1 MMOL/L (ref 3.5–5.1)
RBC # BLD AUTO: 2.94 X10(6)UL (ref 3.8–5.3)
SODIUM SERPL-SCNC: 138 MMOL/L (ref 136–145)
WBC # BLD AUTO: 22.6 X10(3) UL (ref 4–11)

## 2020-01-12 PROCEDURE — 93970 EXTREMITY STUDY: CPT | Performed by: INTERNAL MEDICINE

## 2020-01-12 RX ORDER — FUROSEMIDE 10 MG/ML
20 INJECTION INTRAMUSCULAR; INTRAVENOUS ONCE
Status: COMPLETED | OUTPATIENT
Start: 2020-01-12 | End: 2020-01-12

## 2020-01-12 NOTE — PROGRESS NOTES
[facility] Hospitalist Progress Note     CC: Hospital Follow up    PCP: None Pcp     Assessment/Plan:       Gurmeet Chaitanya 66year old female with PMH sig- unknown (hasn't seen a doctor in years) nicotine use x years here with SOB and leg swelling, found to have CHF  - echo w/ diastolic dysfunction and moderate AR, off lasix, -586    HTN   -  amlodipine on hold    Diarrhea  - c diff negative  - anti-diarrheal agents     GOC  - CODE- FULL  - POA- daughter Chin Mena  - palliative care consulted, s/p family ja weak throughout, flat affect, no respiratory distress   HEENT: EOMI  Neck: Supple   Pulm: diffuse rhonchi b/l, poor inspiratory effort  CV: RRR, no murmurs   ABD: Soft, non-tender, non-distended, +BS  Neuro: Grossly normal, CN intact, sensory intact  Psych nicotine  1 patch Transdermal Daily       Normal Saline Flush, Midazolam HCl, fentaNYL citrate, Naloxone HCl, Flumazenil, Loperamide HCl, Metoclopramide HCl, ipratropium-albuterol, ondansetron (ZOFRAN) IVPB, bisacodyl, Normal Saline Flush, Normal Saline Fl

## 2020-01-12 NOTE — PROGRESS NOTES
[facility] Patient Status:  Inpatient    9/15/1945 MRN [id number]   Location [facility] 2W/SW Attending Misty Troy, [facility] Day # 34 PCP None Pcp     Critical Care Progress Note     Date of Admission: 2019 Furoate-Vilanterol (BREO ELLIPTA) 200-25 MCG/INH inhaler 1 puff, 1 puff, Inhalation, Daily  •  Normal Saline Flush 0.9 % injection 3 mL, 3 mL, Intravenous, PRN  •  acetaminophen (TYLENOL) tab 650 mg, 650 mg, Oral, Q4H PRN **OR** [DISCONTINUED] HYDROcodone- 01/12/2020    CA 7.5 01/12/2020    ALB 1.5 01/12/2020     Lab Results   Component Value Date    INR 0.94 12/16/2019            Assessment / Plan:  1.  Acute hypoxemic respiratory failure - due to lung mass, postobstructive PNA with asst large cavitary lesio

## 2020-01-12 NOTE — PROGRESS NOTES
[facility]    Nephrology Progress Note      SUBJECTIVE:     Oxygen requirements down from 30L to 25L   Feels swollen     PHYSICAL EXAM:     Vital Signs: /55 (BP Location: Right arm)   Pulse 93   Temp 97.9 °F (36.6 °C) (Temporal)   Res NEPERCENT 90.6 12/20/2019    LYPERCENT 3.5 12/20/2019    MOPERCENT 4.6 12/20/2019    EOPERCENT 0.0 12/20/2019    BAPERCENT 0.1 12/20/2019    NE 12.31 (H) 12/20/2019    LYMABS 0.48 (L) 12/20/2019    MOABSO 0.62 12/20/2019    EOABSO 0.00 12/20/2019    BAA 3 mL, Nebulization, Q4H PRN  ondansetron HCl (ZOFRAN) 8 mg in sodium chloride 0.9% 104 mL IVPB, 8 mg, Intravenous, TID PRN  bisacodyl (DULCOLAX) rectal suppository 10 mg, 10 mg, Rectal, Daily PRN  allopurinol (ZYLOPRIM) tab 300 mg, 300 mg, Oral, BID  Angelo Fee

## 2020-01-12 NOTE — PROGRESS NOTES
32 [facility] Infectious Disease  Progress Note    Vivekkaylin Ariadna Patient Status:  Inpatient    9/15/1945 MRN [id number]   Location [facility] 2W/SW Attending Gabino Espinosa MD   Hosp Day # 29 PCP None Pcp     Subjective:  Patient s status in immunocompromised patient s/p initiation of chemotherapy for recently diagnosed SSLCa  - Patient with SVC syndrome, known RUL/hilar mass  - Repeat CT with necrotizing cavitary pneumonia in RUL concerning for bacterial, fungal, TB  - Recent sputum

## 2020-01-12 NOTE — PLAN OF CARE
Problem: Patient Centered Care  Goal: Patient preferences are identified and integrated in the patient's plan of care  Description  Interventions:  - What would you like us to know as we care for you? I haven't been to a doctor in a long time.   I like to Manage/alleviate anxiety  - Monitor for signs/symptoms of CO2 retention  Outcome: Progressing     Problem: COPING  Goal: Pt/Family able to verbalize concerns and demonstrate effective coping strategies  Description  INTERVENTIONS:  - Assist patient/family of falls.   - [place] fall precautions as indicated by assessment.  - Educate pt/family on patient safety including physical limitations  - Instruct pt to call for assistance with activity based on assessment  - Modify environment to reduce risk of injury

## 2020-01-12 NOTE — PLAN OF CARE
Pt alert. Reports no pain. Congested cough noted. Chest tube draining purulent fluid to suction. Turned and positioned q2h for comfort and skin integrity. VSS. No acute distress noted.     Problem: Patient Centered Care  Goal: Patient preferences are Incentive Spirometry  - Assess the need for suctioning and perform as needed  - Assess and instruct to report SOB or any respiratory difficulty  - Respiratory Therapy support as indicated  - Manage/alleviate anxiety  - Monitor for signs/symptoms of CO2 ret SAFETY ADULT - FALL  Goal: Free from fall injury  Description  INTERVENTIONS:  - Assess pt frequently for physical needs  - Identify cognitive and physical deficits and behaviors that affect risk of falls.   - [place] fall precautions as indicated by asse

## 2020-01-13 ENCOUNTER — APPOINTMENT (OUTPATIENT)
Dept: GENERAL RADIOLOGY | Facility: HOSPITAL | Age: 75
DRG: 177 | End: 2020-01-13
Attending: INTERNAL MEDICINE
Payer: MEDICARE

## 2020-01-13 LAB
ALBUMIN SERPL-MCNC: 1.5 G/DL (ref 3.4–5)
ANION GAP SERPL CALC-SCNC: 6 MMOL/L (ref 0–18)
BILIRUB UR QL: NEGATIVE
BUN BLD-MCNC: 15 MG/DL (ref 7–18)
BUN/CREAT SERPL: 15.6 (ref 10–20)
CALCIUM BLD-MCNC: 7.5 MG/DL (ref 8.5–10.1)
CHLORIDE SERPL-SCNC: 101 MMOL/L (ref 98–112)
CLARITY UR: CLEAR
CO2 SERPL-SCNC: 31 MMOL/L (ref 21–32)
COLOR UR: YELLOW
CREAT BLD-MCNC: 0.96 MG/DL (ref 0.55–1.02)
DEPRECATED RDW RBC AUTO: 52.1 FL (ref 35.1–46.3)
ERYTHROCYTE [DISTWIDTH] IN BLOOD BY AUTOMATED COUNT: 15.9 % (ref 11–15)
GLUCOSE BLD-MCNC: 89 MG/DL (ref 70–99)
GLUCOSE UR-MCNC: NEGATIVE MG/DL
HAV IGM SER QL: 1.8 MG/DL (ref 1.6–2.6)
HCT VFR BLD AUTO: 29.5 % (ref 35–48)
HGB BLD-MCNC: 9.5 G/DL (ref 12–16)
HGB UR QL STRIP.AUTO: NEGATIVE
LEUKOCYTE ESTERASE UR QL STRIP.AUTO: NEGATIVE
MCH RBC QN AUTO: 30.4 PG (ref 26–34)
MCHC RBC AUTO-ENTMCNC: 32.2 G/DL (ref 31–37)
MCV RBC AUTO: 94.6 FL (ref 80–100)
NITRITE UR QL STRIP.AUTO: NEGATIVE
OSMOLALITY SERPL CALC.SUM OF ELEC: 286 MOSM/KG (ref 275–295)
PH UR: 5 [PH] (ref 5–8)
PHOSPHATE SERPL-MCNC: 2.5 MG/DL (ref 2.5–4.9)
PLATELET # BLD AUTO: 308 10(3)UL (ref 150–450)
POTASSIUM SERPL-SCNC: 4.5 MMOL/L (ref 3.5–5.1)
PROCALCITONIN SERPL-MCNC: 0.48 NG/ML
PROT UR-MCNC: NEGATIVE MG/DL
RBC # BLD AUTO: 3.12 X10(6)UL (ref 3.8–5.3)
SODIUM SERPL-SCNC: 138 MMOL/L (ref 136–145)
SP GR UR STRIP: 1.01 (ref 1–1.03)
UROBILINOGEN UR STRIP-ACNC: <2
WBC # BLD AUTO: 26.6 X10(3) UL (ref 4–11)

## 2020-01-13 PROCEDURE — 71045 X-RAY EXAM CHEST 1 VIEW: CPT | Performed by: INTERNAL MEDICINE

## 2020-01-13 RX ORDER — FUROSEMIDE 10 MG/ML
20 INJECTION INTRAMUSCULAR; INTRAVENOUS ONCE
Status: COMPLETED | OUTPATIENT
Start: 2020-01-13 | End: 2020-01-13

## 2020-01-13 NOTE — PROGRESS NOTES
[facility] Hospitalist Progress Note     CC: Hospital Follow up    PCP: None Pcp     Assessment/Plan:       Gretta Mclean 66year old female with PMH sig- unknown (hasn't seen a doctor in years) nicotine use x years here with SOB and leg swelling, found to have and moderate AR, off lasix, -586    HTN   -  amlodipine on hold    Diarrhea  - c diff negative  - anti-diarrheal agents     GOC  - CODE- FULL  - POA- daughter Lucas Suches  - palliative care consulted, s/p family meeting on 1/8, no limitations set, remains no murmurs   ABD: Soft, non-tender, non-distended, +BS  Neuro: Grossly normal, CN intact, sensory intact  Psych: Affect- flat  SKIN: warm, dry  EXT: 1+ pitting edema    Data Review:       Labs:     Recent Labs   Lab 01/11/20  0432 01/12/20  0445 01/13/20 HYDROcodone-acetaminophen, ondansetron HCl, influenza virus vaccine PF, hydrALAzine HCl

## 2020-01-13 NOTE — PROGRESS NOTES
Pulmonary Progress Note     Assessment / Plan:  1.  Acute hypoxemic respiratory failure - due to lung mass, postobstructive PNA with asst large cavitary lesion.   - wean from heated high flow nasal cannula to regular nasal cannula today  - metaneb or flutte diminished at the bases  Chest - right chest drain in place  Cardiovascular - regular rate and rhythm, no MRG  Abdo - soft, NTND  Ext - no edema  Mental status - interactive and answering questions appropriately    Medications:  Reviewed in EMR    Lab Data

## 2020-01-13 NOTE — PHYSICAL THERAPY NOTE
PHYSICAL THERAPY TREATMENT NOTE - INPATIENT     Room Number: 430/330-I       Presenting Problem: lung mass new dx     Problem List  Principal Problem:    Lung mass  Active Problems:    Swelling of lower extremity    Hypoxia    Goals of care, counseling/dis Standing: Fair -  Dynamic [address]                         O2 WALK                  AM-PAC '6-Clicks' INPATIENT SHORT FORM - BASIC MOBILITY  How much difficulty does the patient currently have. ..  -   Turning over in bed (incl demonstrate independence with home activity/exercise instructions provided to patient in preparation for discharge.    Goal #5   Current Status IN PROGRESS   Goal #6     Goal #6  Current Status

## 2020-01-13 NOTE — CM/SW NOTE
Care Management    Patient on Day #30 of hospitalization for lung mass, small cell lung Ca. Pt had not seen MD in several years. She received chemo 12/19 and has had complications of hypoxia/fluid overload/pneumonia and MEAGHAN.  She was transferred to PCU on [phone]    Addendum 1/16/2020: CM called Kai MATA. After touring facilities, family choice is [facility]. LiaisonKrystle, notified of choice and will reach out to the family. PCS for ambulance on chart.     Addendum 1/17/2020- Pt with new onset he

## 2020-01-13 NOTE — OCCUPATIONAL THERAPY NOTE
OCCUPATIONAL THERAPY TREATMENT NOTE - INPATIENT        Room Number: 057/850-V         Problem List  Principal Problem:    Lung mass  Active Problems:    Swelling of lower extremity    Hypoxia    Goals of care, counseling/discussion      OCCUPATIONAL THERAP Short Form  How much help from another person does the patient currently need…  -   Putting on and taking off regular lower body clothing?: A Lot  -   Bathing (including washing, rinsing, drying)?: A Lot  -   Toileting, which includes using toilet, bedpan

## 2020-01-13 NOTE — PLAN OF CARE
Pt alert. Reports no pain. Tolerating airvo at present settings. Dressings to left buttocks changed. Turned and positioned q2h for comfort and skin integrity. Chest tube draining purulent drainage to suction. VSS. No acute distress noted.     Problem broncho-pulmonary hygiene including cough, deep breathe, Incentive Spirometry  - Assess the need for suctioning and perform as needed  - Assess and instruct to report SOB or any respiratory difficulty  - Respiratory Therapy support as indicated  - Manage/a therapy as ordered  Outcome: Progressing     Problem: SAFETY ADULT - FALL  Goal: Free from fall injury  Description  INTERVENTIONS:  - Assess pt frequently for physical needs  - Identify cognitive and physical deficits and behaviors that affect risk of fal

## 2020-01-13 NOTE — PROGRESS NOTES
[facility]    Nephrology Progress Note      SUBJECTIVE:     O2 requirements stable at 25L today. Tolerated lasix 20mg IV yesterday.     PHYSICAL EXAM:     Vital Signs: /57 (BP Location: Left arm)   Pulse 97   Temp 98.6 °F (37 °C)   Re NEPERCENT 90.6 12/20/2019    LYPERCENT 3.5 12/20/2019    MOPERCENT 4.6 12/20/2019    EOPERCENT 0.0 12/20/2019    BAPERCENT 0.1 12/20/2019    NE 12.31 (H) 12/20/2019    LYMABS 0.48 (L) 12/20/2019    MOABSO 0.62 12/20/2019    EOABSO 0.00 12/20/2019    BAABSO mL, Nebulization, Q4H PRN  ondansetron HCl (ZOFRAN) 8 mg in sodium chloride 0.9% 104 mL IVPB, 8 mg, Intravenous, TID PRN  bisacodyl (DULCOLAX) rectal suppository 10 mg, 10 mg, Rectal, Daily PRN  allopurinol (ZYLOPRIM) tab 300 mg, 300 mg, Oral, BID  Normal following  - wean oxygen as tolerated. # Hypophosphatemia   - replacement ordered  - encourage PO intake      #.  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

## 2020-01-13 NOTE — PROGRESS NOTES
[facility] Infectious Disease  Progress Note    Maura Maynard Patient Status:  Inpatient    9/15/1945 MRN [id number]   Location [facility] 2W/SW Attending Herb Estrada MD   Hosp Day # 30 PCP None Pcp     Subjective:  Patient seen/exam SSLCa  - Patient with SVC syndrome, known RUL/hilar mass  - Repeat CT with necrotizing cavitary pneumonia in RUL concerning for bacterial, fungal, TB  - Recent sputum with pseudomonas x2  - AFB isolation with sputums negative thus far, MTB PCR negative  -

## 2020-01-14 LAB
ALBUMIN SERPL-MCNC: 1.6 G/DL (ref 3.4–5)
ALBUMIN SERPL-MCNC: 1.6 G/DL (ref 3.4–5)
ALP LIVER SERPL-CCNC: 209 U/L (ref 55–142)
ALT SERPL-CCNC: 40 U/L (ref 13–56)
ANION GAP SERPL CALC-SCNC: 4 MMOL/L (ref 0–18)
AST SERPL-CCNC: 34 U/L (ref 15–37)
BASOPHILS # BLD: 0.49 X10(3) UL (ref 0–0.2)
BASOPHILS NFR BLD: 2 %
BILIRUB DIRECT SERPL-MCNC: 0.4 MG/DL (ref 0–0.2)
BILIRUB SERPL-MCNC: 0.6 MG/DL (ref 0.1–2)
BLASTS # BLD: 0.25 X10(3) UL
BLASTS NFR BLD: 1 %
BUN BLD-MCNC: 17 MG/DL (ref 7–18)
BUN/CREAT SERPL: 17.7 (ref 10–20)
CALCIUM BLD-MCNC: 7.6 MG/DL (ref 8.5–10.1)
CHLORIDE SERPL-SCNC: 101 MMOL/L (ref 98–112)
CO2 SERPL-SCNC: 32 MMOL/L (ref 21–32)
CREAT BLD-MCNC: 0.96 MG/DL (ref 0.55–1.02)
DEPRECATED RDW RBC AUTO: 52.2 FL (ref 35.1–46.3)
EOSINOPHIL # BLD: 0.25 X10(3) UL (ref 0–0.7)
EOSINOPHIL NFR BLD: 1 %
ERYTHROCYTE [DISTWIDTH] IN BLOOD BY AUTOMATED COUNT: 16.3 % (ref 11–15)
GLUCOSE BLD-MCNC: 93 MG/DL (ref 70–99)
HAV IGM SER QL: 1.8 MG/DL (ref 1.6–2.6)
HCT VFR BLD AUTO: 29.9 % (ref 35–48)
HGB BLD-MCNC: 9.7 G/DL (ref 12–16)
LYMPHOCYTES NFR BLD: 1.23 X10(3) UL (ref 1–4)
LYMPHOCYTES NFR BLD: 4 %
M PROTEIN MFR SERPL ELPH: 4.9 G/DL (ref 6.4–8.2)
MCH RBC QN AUTO: 31 PG (ref 26–34)
MCHC RBC AUTO-ENTMCNC: 32.4 G/DL (ref 31–37)
MCV RBC AUTO: 95.5 FL (ref 80–100)
METAMYELOCYTES # BLD: 0.98 X10(3) UL
METAMYELOCYTES NFR BLD: 4 %
MONOCYTES # BLD: 2.45 X10(3) UL (ref 0.1–1)
MONOCYTES NFR BLD: 10 %
MYELOCYTES # BLD: 0.49 X10(3) UL
MYELOCYTES NFR BLD: 2 %
NEUTROPHILS # BLD AUTO: 15.27 X10 (3) UL (ref 1.5–7.7)
NEUTROPHILS NFR BLD: 62 %
NEUTS BAND NFR BLD: 13 %
NEUTS HYPERSEG # BLD: 18.38 X10(3) UL (ref 1.5–7.7)
NRBC BLD MANUAL-RTO: 1 %
OSMOLALITY SERPL CALC.SUM OF ELEC: 285 MOSM/KG (ref 275–295)
PHOSPHATE SERPL-MCNC: 2 MG/DL (ref 2.5–4.9)
PLATELET # BLD AUTO: 298 10(3)UL (ref 150–450)
PLATELET MORPHOLOGY: NORMAL
POTASSIUM SERPL-SCNC: 3.7 MMOL/L (ref 3.5–5.1)
RBC # BLD AUTO: 3.13 X10(6)UL (ref 3.8–5.3)
SODIUM SERPL-SCNC: 137 MMOL/L (ref 136–145)
TOTAL CELLS COUNTED: 100
VARIANT LYMPHS NFR BLD MANUAL: 1 %
WBC # BLD AUTO: 24.5 X10(3) UL (ref 4–11)

## 2020-01-14 RX ORDER — MAGNESIUM SULFATE HEPTAHYDRATE 40 MG/ML
2 INJECTION, SOLUTION INTRAVENOUS ONCE
Status: COMPLETED | OUTPATIENT
Start: 2020-01-14 | End: 2020-01-14

## 2020-01-14 RX ORDER — FUROSEMIDE 10 MG/ML
20 INJECTION INTRAMUSCULAR; INTRAVENOUS ONCE
Status: COMPLETED | OUTPATIENT
Start: 2020-01-14 | End: 2020-01-14

## 2020-01-14 RX ORDER — POTASSIUM CHLORIDE 14.9 MG/ML
20 INJECTION INTRAVENOUS ONCE
Status: COMPLETED | OUTPATIENT
Start: 2020-01-14 | End: 2020-01-14

## 2020-01-14 NOTE — PROGRESS NOTES
32 [facility] Infectious Disease  Progress Note    Erick Dougherty Patient Status:  Inpatient    9/15/1945 MRN [id number]   Location [facility] 2W/SW Attending Bennie Cavanaugh MD   Hosp Day # 31 PCP None Pcp     Subjective:  Patient seen/exam Reviewed:  Zosyn, voriconazole - at least day #18 of this current regimen     Assessment and Plan:     1.  Worsening pulmonary status in immunocompromised patient s/p initiation of chemotherapy for recently diagnosed SSLCa  - Patient with SVC syndrome, philip

## 2020-01-14 NOTE — PROGRESS NOTES
[facility] Hospitalist Progress Note     CC: Hospital Follow up    PCP: None Pcp     Assessment/Plan:       Chris Florentino 66year old female with PMH sig- unknown (hasn't seen a doctor in years) nicotine use x years here with SOB and leg swelling, found to have MEAGHAN   - sCr stable       CHF  - echo w/ diastolic dysfunction and moderate AR, off lasix, -586    HTN   -  amlodipine on hold    Diarrhea  - c diff negative  - anti-diarrheal agents     GOC  - CODE- FULL  - POA- daughter Jerrold Sandifer  - palliative care inspiratory effort  CV: RRR, no murmurs   ABD: Soft, non-tender, non-distended, +BS  Neuro: Grossly normal, CN intact, sensory intact  Psych: Affect- flat  SKIN: warm, dry  EXT: 1+ pitting edema    Data Review:       Labs:     Recent Labs   Lab 01/12/20  0 mL Nebulization TID   • piperacillin-tazobactam  3.375 g Intravenous Q8H   • vancomycin HCl  125 mg Oral Daily   • allopurinol  300 mg Oral BID   • Fluticasone Furoate-Vilanterol  1 puff Inhalation Daily   • nicotine  1 patch Transdermal Daily       Normal

## 2020-01-14 NOTE — PLAN OF CARE
Problem: GASTROINTESTINAL - ADULT  Goal: Maintains adequate nutritional intake (undernourished)  Description  INTERVENTIONS:  - Monitor percentage of each meal consumed  - Identify factors contributing to decreased intake, treat as appropriate  - Assist

## 2020-01-14 NOTE — CONSULTS
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  \"    H PERRL, EOMI  Neck: Supple, trachea midline, no JVD, no masses. Thyroid not grossly enlarged  Nodes: no cervical or supraclavicular lymphadenopathy appreciated  Heart: regular rate and rhythm. No murmurs, rubs or gallops. No lower extremity edema.   Lungs: N

## 2020-01-14 NOTE — PROGRESS NOTES
Pulmonary Progress Note     Assessment / Plan:  1.  Acute hypoxemic respiratory failure - due to lung mass, postobstructive PNA with asst large cavitary lesion.   - remains on HFNC, wean today d/w RN  - metaneb or flutter valve prn; discussed with RCP  - OO Resp: 19 18 15 15   Temp:   96.9 °F (36.1 °C)    TempSrc:   Temporal    SpO2: 97% 96% 97% 100%   Weight:       Height:         Physical Exam:  General - on heated high flow nasal cannula  Respiratory - diminished at the bases  Chest - right chest drain i

## 2020-01-14 NOTE — PLAN OF CARE
Problem: SAFETY ADULT - FALL  Goal: Free from fall injury  Description  INTERVENTIONS:  - Assess pt frequently for physical needs  - Identify cognitive and physical deficits and behaviors that affect risk of falls.   - [place] fall precautions as indica

## 2020-01-14 NOTE — WOUND PROGRESS NOTE
Wound Care Services  Follow up on the pt. she is resting in bed, watching TV, pt. c/o discomfort from her chest tube, the pt. assisted with turning in bed, left hip old blood blister wound is  and smaller, left lower hip skin tear with healed pink t

## 2020-01-14 NOTE — PROGRESS NOTES
[facility]    Nephrology Progress Note      SUBJECTIVE:     O2 requirements improved with Lasix now down to 8L O2.    PHYSICAL EXAM:     Vital Signs: /60 (BP Location: Right arm)   Pulse 87   Temp 96.9 °F (36.1 °C) (Temporal)   Resp 1 01/14/2020    BASO 2 01/14/2020    NEPERCENT 90.6 12/20/2019    LYPERCENT 3.5 12/20/2019    MOPERCENT 4.6 12/20/2019    EOPERCENT 0.0 12/20/2019    BAPERCENT 0.1 12/20/2019    NE 12.31 (H) 12/20/2019    LYMABS 0.48 (L) 12/20/2019    MOABSO 0.62 12/20/2019 Q8H  vancomycin HCl (FIRVANQ) 50 MG/ML oral solution 125 mg, 125 mg, Oral, Daily  Metoclopramide HCl (REGLAN) injection 5 mg, 5 mg, Intravenous, Q8H PRN  ipratropium-albuterol (DUONEB) nebulizer solution 3 mL, 3 mL, Nebulization, Q4H PRN  ondansetron HCl ( small b/l pleural effusions. - Will give additional lasix today. #. Acute hypoxemic respiratory failure  - due to lung mass and post obstructive, cavitary, pneumonia. - ID and pulmonary medicine following  - wean oxygen as tolerated.       # Hypopho

## 2020-01-14 NOTE — DIETARY NOTE
ADULT NUTRITION UPDATE NOTE 1/14. Recommend: CPM. Pt refusing feeding tube. Pt willing to trial 2 new supplements.    NUTRITION DIAGNOSIS/PROBLEM:  Inadequate oral intake related to decreased ability to consume sufficient energy as evidenced by no bebo today for chest tube today and now to drink Ensure Enlive and asked for X2 ice-cream. Encouraging po intake and if nothing else to drink the supplements to avoid feeding tube. 1/6 update: Po intake appears poor (see intake below).  Pt cannot remember what zofran prn. GI: C-DIFF neg. LABS: reviewed. Replacing Mg++ and K+. Low phos. and  Not replaced until <1.8 per protocol.     Recent Labs     01/12/20  0445 01/13/20  0532 01/14/20  0429   GLU 95 89 93   BUN 16 15 17   CREATSERUM 0.96 0.96 0.96   CA 7.5*

## 2020-01-15 LAB
ALBUMIN SERPL-MCNC: 1.6 G/DL (ref 3.4–5)
ANION GAP SERPL CALC-SCNC: 3 MMOL/L (ref 0–18)
BASOPHILS # BLD: 0.47 X10(3) UL (ref 0–0.2)
BASOPHILS NFR BLD: 2 %
BUN BLD-MCNC: 19 MG/DL (ref 7–18)
BUN/CREAT SERPL: 19.4 (ref 10–20)
CALCIUM BLD-MCNC: 7.6 MG/DL (ref 8.5–10.1)
CHLORIDE SERPL-SCNC: 101 MMOL/L (ref 98–112)
CO2 SERPL-SCNC: 32 MMOL/L (ref 21–32)
CREAT BLD-MCNC: 0.98 MG/DL (ref 0.55–1.02)
DEPRECATED RDW RBC AUTO: 53.1 FL (ref 35.1–46.3)
EOSINOPHIL # BLD: 0.24 X10(3) UL (ref 0–0.7)
EOSINOPHIL NFR BLD: 1 %
ERYTHROCYTE [DISTWIDTH] IN BLOOD BY AUTOMATED COUNT: 16.9 % (ref 11–15)
GLUCOSE BLD-MCNC: 98 MG/DL (ref 70–99)
HAV IGM SER QL: 2.3 MG/DL (ref 1.6–2.6)
HCT VFR BLD AUTO: 27.8 % (ref 35–48)
HGB BLD-MCNC: 8.9 G/DL (ref 12–16)
LYMPHOCYTES NFR BLD: 2.12 X10(3) UL (ref 1–4)
LYMPHOCYTES NFR BLD: 9 %
MCH RBC QN AUTO: 30.8 PG (ref 26–34)
MCHC RBC AUTO-ENTMCNC: 32 G/DL (ref 31–37)
MCV RBC AUTO: 96.2 FL (ref 80–100)
METAMYELOCYTES # BLD: 0.47 X10(3) UL
METAMYELOCYTES NFR BLD: 2 %
MONOCYTES # BLD: 1.18 X10(3) UL (ref 0.1–1)
MONOCYTES NFR BLD: 5 %
MYELOCYTES # BLD: 0.47 X10(3) UL
MYELOCYTES NFR BLD: 2 %
NEUTROPHILS # BLD AUTO: 14.32 X10 (3) UL (ref 1.5–7.7)
NEUTROPHILS NFR BLD: 71 %
NEUTS BAND NFR BLD: 8 %
NEUTS HYPERSEG # BLD: 18.64 X10(3) UL (ref 1.5–7.7)
OSMOLALITY SERPL CALC.SUM OF ELEC: 284 MOSM/KG (ref 275–295)
PHOSPHATE SERPL-MCNC: 2.3 MG/DL (ref 2.5–4.9)
PLATELET # BLD AUTO: 281 10(3)UL (ref 150–450)
PLATELET MORPHOLOGY: NORMAL
POTASSIUM SERPL-SCNC: 3.6 MMOL/L (ref 3.5–5.1)
RBC # BLD AUTO: 2.89 X10(6)UL (ref 3.8–5.3)
SODIUM SERPL-SCNC: 136 MMOL/L (ref 136–145)
TOTAL CELLS COUNTED: 100
WBC # BLD AUTO: 23.6 X10(3) UL (ref 4–11)

## 2020-01-15 RX ORDER — IPRATROPIUM BROMIDE AND ALBUTEROL SULFATE 2.5; .5 MG/3ML; MG/3ML
3 SOLUTION RESPIRATORY (INHALATION)
Status: DISCONTINUED | OUTPATIENT
Start: 2020-01-15 | End: 2020-01-20

## 2020-01-15 RX ORDER — POTASSIUM CHLORIDE 29.8 MG/ML
40 INJECTION INTRAVENOUS ONCE
Status: COMPLETED | OUTPATIENT
Start: 2020-01-15 | End: 2020-01-15

## 2020-01-15 NOTE — DIETARY NOTE
NUTRITION NOTE UPDATE:     Pt refused all food today thus far (breakfast and lunch) but did take 1 Ensure Enlive and 1 [place] Instant Breakfast shake to provide ~ 855 kcals, 37 gm protein. Has yet to eat dinner tonight.   Pt

## 2020-01-15 NOTE — PROGRESS NOTES
Thoracic Surgery Progress Note     Laura Luz is a 66year old female. MRN [id number]. Admitted 2019    [address] [place] [address] EVENTS:     No acute events overnight.        Objective:     VITALS:     Temp (24hrs), Av.4 °F (36.3 °C), Min:96.9 °F (36 PHOS 2.3 01/15/2020         Assessment/Plan:     66year old female with diagnosed stage 4 SCLC on chemotherapy c/b cavitary lung abscess with underlying necrotic lung. Not suitable candidate for surgery.     Recommend maintaining chest tube for continue

## 2020-01-15 NOTE — PROGRESS NOTES
Pulmonary Progress Note     Assessment / Plan:  1.  Acute hypoxemic respiratory failure - due to lung mass, postobstructive PNA with asst large cavitary lesion   - wean supplemental O2 as able  - metaneb or flutter valve prn  - OOB to chair as tolerated and place  Cardiovascular - regular rate and rhythm, no MRG  Abdo - soft, NTND  Ext - no edema  Mental status - interactive and answering questions appropriately    Medications:  Reviewed in EMR    Lab Data:  Reviewed in EMR    Imaging:  I independently visual

## 2020-01-15 NOTE — PROGRESS NOTES
32 [facility] Infectious Disease  Progress Note    Amey Primrose Patient Status:  Inpatient    9/15/1945 MRN [id number]   Location [facility] 2W/SW Attending Justa Reynoso MD   Hosp Day # 28 PCP None Pcp     Subjective:  Patient seen/exam patient s/p initiation of chemotherapy for recently diagnosed SSLCa  - Patient with SVC syndrome, known RUL/hilar mass  - Repeat CT with necrotizing cavitary pneumonia in RUL concerning for bacterial, fungal, TB  - Recent sputum with pseudomonas x2  - AFB

## 2020-01-15 NOTE — CM/SW NOTE
BPCI KHARI Proposal:  Met with patient at bedside to explain the BPCI/Medicare program. Patient was enrolled under DRG  177         . BPCI/Medicare Letter provided.         KHARI Proposal- [id number]

## 2020-01-15 NOTE — PHYSICAL THERAPY NOTE
PHYSICAL THERAPY TREATMENT NOTE - INPATIENT     Room Number: 088/264-E       Presenting Problem:      Problem List  Principal Problem:    Lung mass  Active Problems:    Swelling of lower extremity    Hypoxia    Goals of care, counseling/discussion      PEARL ASSESSMENT   Rating: 3  Location: feet in standing  Management Techniques:  Activity promotion;Relaxation;Repositioning;Breathing techniques    BALANCE pumps  Glut sets  Hip AB/AD  Heel slides  Hip adduction squeezes  LAQ  Quad sets  SLR  Core stabilization and UE AAROM\     Position Sitting & Standing       Patient End of Session: Up in chair;Needs met;Call light within reach;RN aware of session/findings

## 2020-01-15 NOTE — PROGRESS NOTES
[facility]    Nephrology Progress Note      SUBJECTIVE:     O2 requirements improved with Lasix now down to 4L O2.    PHYSICAL EXAM:     Vital Signs: /51 (BP Location: Right arm)   Pulse 99   Temp 96.9 °F (36.1 °C) (Temporal)   Resp 1 NEPERCENT 90.6 12/20/2019    LYPERCENT 3.5 12/20/2019    MOPERCENT 4.6 12/20/2019    EOPERCENT 0.0 12/20/2019    BAPERCENT 0.1 12/20/2019    NE 12.31 (H) 12/20/2019    LYMABS 0.48 (L) 12/20/2019    MOABSO 0.62 12/20/2019    EOABSO 0.00 12/20/2019    BAA Daily  Normal Saline Flush 0.9 % injection 3 mL, 3 mL, Intravenous, PRN  acetaminophen (TYLENOL) tab 650 mg, 650 mg, Oral, Q4H PRN  ondansetron HCl (ZOFRAN) injection 4 mg, 4 mg, Intravenous, Q6H PRN  influenza vaccine (PF)(FLUZONE HD) high dose for 65 yrs

## 2020-01-15 NOTE — PLAN OF CARE
Problem: SAFETY ADULT - FALL  Goal: Free from fall injury  Description  INTERVENTIONS:  - Assess pt frequently for physical needs  - Identify cognitive and physical deficits and behaviors that affect risk of falls.   - [place] fall precautions as indica

## 2020-01-15 NOTE — PLAN OF CARE
Alert oriented, Up in chair with lift, vss, denies sob, on 4lnc. Poor appetite unchanged, encourage diet. Massaged bilateral foot due to c/o discomfort, relieved. SW to contact dtr regarding LTAC.    Problem: Patient Centered Care  Goal: Patient preferences breathe, Incentive Spirometry  - Assess the need for suctioning and perform as needed  - Assess and instruct to report SOB or any respiratory difficulty  - Respiratory Therapy support as indicated  - Manage/alleviate anxiety  - Monitor for signs/symptoms o Problem: SAFETY ADULT - FALL  Goal: Free from fall injury  Description  INTERVENTIONS:  - Assess pt frequently for physical needs  - Identify cognitive and physical deficits and behaviors that affect risk of falls.   - [place] fall precautions as indica for food preferences  - Enhance eating environment  Outcome: Progressing

## 2020-01-16 LAB
ALBUMIN SERPL-MCNC: 1.6 G/DL (ref 3.4–5)
ANION GAP SERPL CALC-SCNC: 4 MMOL/L (ref 0–18)
BASOPHILS # BLD: 0.27 X10(3) UL (ref 0–0.2)
BASOPHILS NFR BLD: 1 %
BUN BLD-MCNC: 17 MG/DL (ref 7–18)
BUN/CREAT SERPL: 16.2 (ref 10–20)
CALCIUM BLD-MCNC: 7.4 MG/DL (ref 8.5–10.1)
CHLORIDE SERPL-SCNC: 102 MMOL/L (ref 98–112)
CO2 SERPL-SCNC: 30 MMOL/L (ref 21–32)
CREAT BLD-MCNC: 1.05 MG/DL (ref 0.55–1.02)
DEPRECATED RDW RBC AUTO: 52.1 FL (ref 35.1–46.3)
EOSINOPHIL # BLD: 0 X10(3) UL (ref 0–0.7)
EOSINOPHIL NFR BLD: 0 %
ERYTHROCYTE [DISTWIDTH] IN BLOOD BY AUTOMATED COUNT: 17.2 % (ref 11–15)
GLUCOSE BLD-MCNC: 103 MG/DL (ref 70–99)
HAV IGM SER QL: 2.1 MG/DL (ref 1.6–2.6)
HCT VFR BLD AUTO: 27.8 % (ref 35–48)
HGB BLD-MCNC: 8.9 G/DL (ref 12–16)
LYMPHOCYTES NFR BLD: 0.54 X10(3) UL (ref 1–4)
LYMPHOCYTES NFR BLD: 2 %
MCH RBC QN AUTO: 30.2 PG (ref 26–34)
MCHC RBC AUTO-ENTMCNC: 32 G/DL (ref 31–37)
MCV RBC AUTO: 94.2 FL (ref 80–100)
METAMYELOCYTES # BLD: 0.54 X10(3) UL
METAMYELOCYTES NFR BLD: 2 %
MONOCYTES # BLD: 1.34 X10(3) UL (ref 0.1–1)
MONOCYTES NFR BLD: 5 %
MYELOCYTES # BLD: 0.8 X10(3) UL
MYELOCYTES NFR BLD: 3 %
NEUTROPHILS # BLD AUTO: 15.9 X10 (3) UL (ref 1.5–7.7)
NEUTROPHILS NFR BLD: 72 %
NEUTS BAND NFR BLD: 15 %
NEUTS HYPERSEG # BLD: 23.32 X10(3) UL (ref 1.5–7.7)
OSMOLALITY SERPL CALC.SUM OF ELEC: 284 MOSM/KG (ref 275–295)
PHOSPHATE SERPL-MCNC: 1.9 MG/DL (ref 2.5–4.9)
PLATELET # BLD AUTO: 286 10(3)UL (ref 150–450)
PLATELET MORPHOLOGY: NORMAL
POTASSIUM SERPL-SCNC: 4.3 MMOL/L (ref 3.5–5.1)
RBC # BLD AUTO: 2.95 X10(6)UL (ref 3.8–5.3)
SODIUM SERPL-SCNC: 136 MMOL/L (ref 136–145)
TOTAL CELLS COUNTED: 100
WBC # BLD AUTO: 26.8 X10(3) UL (ref 4–11)

## 2020-01-16 RX ORDER — FUROSEMIDE 10 MG/ML
20 INJECTION INTRAMUSCULAR; INTRAVENOUS ONCE
Status: COMPLETED | OUTPATIENT
Start: 2020-01-16 | End: 2020-01-16

## 2020-01-16 NOTE — OCCUPATIONAL THERAPY NOTE
OCCUPATIONAL THERAPY TREATMENT NOTE - INPATIENT        Room Number: 187/789-C      Problem List  Principal Problem:    Lung mass  Active Problems:    Swelling of lower extremity    Hypoxia    Goals of care, counseling/discussion      OCCUPATIONAL THERAPY A much help from another person does the patient currently need…  -   Putting on and taking off regular lower body clothing?: A Lot  -   Bathing (including washing, rinsing, drying)?: A Lot  -   Toileting, which includes using toilet, bedpan or urinal? : A L

## 2020-01-16 NOTE — PROGRESS NOTES
32 [facility] Infectious Disease Progress Note    Chet Landin Patient Status:  Inpatient    9/15/1945 MRN [id number]   Location [facility] 2W/SW Attending Dione Oshea MD   Hosp Day # 33 PCP None Pcp     Subjective:  Patient see dose for 65 yrs & older inj 0.5ml, 0.5 mL, Intramuscular, Prior to discharge  •  nicotine (NICODERM CQ) 21 MG/24HR 1 patch, 1 patch, Transdermal, Daily  •  hydrALAzine HCl (APRESOLINE) injection 10 mg, 10 mg, Intravenous, Q6H PRN    Physical Exam:  General syndrome, known RUL/hilar mass  - Repeat CT with necrotizing cavitary pneumonia in RUL concerning for bacterial, fungal, TB  - Recent sputum with pseudomonas x2  - AFB isolation with sputums negative thus far, MTB PCR negative  - S/P CT guided pigtail ches

## 2020-01-16 NOTE — PLAN OF CARE
Pt received at 0730. Alert and oriented x4. Saturating well on 4L hfnc. NSR/ ST  on tele. PIV patent. Denies pain or discomfort. Pt medical overflow until able to d/c to [facility] tomorrow. Will continue to monitor.       Problem: Patient Centered Care  Goal: cough, deep breathe, Incentive Spirometry  - Assess the need for suctioning and perform as needed  - Assess and instruct to report SOB or any respiratory difficulty  - Respiratory Therapy support as indicated  - Manage/alleviate anxiety  - Monitor for sign Progressing     Problem: SAFETY ADULT - FALL  Goal: Free from fall injury  Description  INTERVENTIONS:  - Assess pt frequently for physical needs  - Identify cognitive and physical deficits and behaviors that affect risk of falls.   - [place] fall precaut from home; allow for food preferences  - Enhance eating environment  Outcome: Progressing

## 2020-01-16 NOTE — PROGRESS NOTES
[facility] Hospitalist Progress Note     CC: Hospital Follow up    PCP: None Pcp     Assessment/Plan:     Linda Rolymark 66year old female with PMH sig- unknown (hasn't seen a doctor in years) nicotine use x years here with SOB and leg swelling, found to have l status    Depression/anxiety  - psych liaison consulted  - not open to seeing psychiatrist at this point  - consider starting remeron, if appetite allows    Pancytopenia: neutropenia and thrombocytopenia resolved, hit neg  - received neuopogen  - anemia pe kg)  01/04/20 0600 : 162 lb 9.6 oz (73.8 kg)  01/01/20 0500 : 165 lb 9.6 oz (75.1 kg)  12/31/19 1100 : 173 lb (78.5 kg)  12/28/19 0700 : 162 lb 4.8 oz (73.6 kg)  12/25/19 0516 : 166 lb 8 oz (75.5 kg)  12/24/19 0357 : 166 lb 12.8 oz (75.7 kg)  12/23/19 0507 pigtail drain. Increased lucency surrounding the tip of the pigtail drain noted which may reflect improved lung aeration and or increase in a cavitary component.      Dictated by (CST): Cricket Saucedo MD on 1/13/2020 at 15:49     Approved by (CST): Suzanna

## 2020-01-16 NOTE — PROGRESS NOTES
Pulmonary Progress Note     Assessment / Plan:  1.  Acute hypoxemic respiratory failure - due to lung mass, postobstructive PNA with asst large cavitary lesion   - wean supplemental O2 as able, significantly improved, down to 4lpm  - metaneb or flutter valv Exam:  General - nad  Respiratory - diminished at the bases  Chest - right chest drain in place  Cardiovascular - regular rate and rhythm, no MRG  Abdo - soft, NTND  Ext - no edema  Mental status - interactive and answering questions appropriately    Medic

## 2020-01-16 NOTE — PROGRESS NOTES
[facility]    Nephrology Progress Note      SUBJECTIVE:     O2 requirements stable overnight.     PHYSICAL EXAM:     Vital Signs: /51 (BP Location: Right arm)   Pulse 104   Temp 97.8 °F (36.6 °C) (Temporal)   Resp (!) 0   Ht 5' 2\" (1. NEPERCENT 90.6 12/20/2019    LYPERCENT 3.5 12/20/2019    MOPERCENT 4.6 12/20/2019    EOPERCENT 0.0 12/20/2019    BAPERCENT 0.1 12/20/2019    NE 12.31 (H) 12/20/2019    LYMABS 0.48 (L) 12/20/2019    MOABSO 0.62 12/20/2019    EOABSO 0.00 12/20/2019    BAABSO PRN  acetaminophen (TYLENOL) tab 650 mg, 650 mg, Oral, Q4H PRN  ondansetron HCl (ZOFRAN) injection 4 mg, 4 mg, Intravenous, Q6H PRN  influenza vaccine (PF)(FLUZONE HD) high dose for 65 yrs & older inj 0.5ml, 0.5 mL, Intramuscular, Prior to discharge  renut

## 2020-01-16 NOTE — PHYSICAL THERAPY NOTE
PHYSICAL THERAPY TREATMENT NOTE - INPATIENT     Room Number: 302/736-M       Presenting Problem:      Problem List  Principal Problem:    Lung mass  Active Problems:    Swelling of lower extremity    Hypoxia    Goals of care, counseling/discussion      PEARL HF O2)    WEIGHT BEARING RESTRICTION  Weight Bearing Restriction: None    PAIN ASSESSMENT   Rating: (not rated)  Location: BLE hypersensitive  Management Techniques:  Activity promotion;Relaxation;Repositioning    BALANCE Goal Patient's self-stated goal is: to get stronger and walk again   Goal #1 Patient is able to demonstrate supine - sit EOB @ level: supervision      Goal #1   Current Status Supine to sit tx at MOD A, 2nd person for safety   Goal #2 Patient is able to de

## 2020-01-17 LAB
ALBUMIN SERPL-MCNC: 1.7 G/DL (ref 3.4–5)
ANION GAP SERPL CALC-SCNC: 3 MMOL/L (ref 0–18)
BASOPHILS # BLD: 0.27 X10(3) UL (ref 0–0.2)
BASOPHILS NFR BLD: 1 %
BUN BLD-MCNC: 19 MG/DL (ref 7–18)
BUN/CREAT SERPL: 20.2 (ref 10–20)
CALCIUM BLD-MCNC: 7.4 MG/DL (ref 8.5–10.1)
CHLORIDE SERPL-SCNC: 102 MMOL/L (ref 98–112)
CO2 SERPL-SCNC: 31 MMOL/L (ref 21–32)
CREAT BLD-MCNC: 0.94 MG/DL (ref 0.55–1.02)
DEPRECATED RDW RBC AUTO: 54.7 FL (ref 35.1–46.3)
EOSINOPHIL # BLD: 0 X10(3) UL (ref 0–0.7)
EOSINOPHIL NFR BLD: 0 %
ERYTHROCYTE [DISTWIDTH] IN BLOOD BY AUTOMATED COUNT: 17.8 % (ref 11–15)
GLUCOSE BLD-MCNC: 108 MG/DL (ref 70–99)
HAV IGM SER QL: 2.1 MG/DL (ref 1.6–2.6)
HCT VFR BLD AUTO: 28.2 % (ref 35–48)
HGB BLD-MCNC: 8.8 G/DL (ref 12–16)
LYMPHOCYTES NFR BLD: 1.35 X10(3) UL (ref 1–4)
LYMPHOCYTES NFR BLD: 5 %
MCH RBC QN AUTO: 29.8 PG (ref 26–34)
MCHC RBC AUTO-ENTMCNC: 31.2 G/DL (ref 31–37)
MCV RBC AUTO: 95.6 FL (ref 80–100)
METAMYELOCYTES # BLD: 2.97 X10(3) UL
METAMYELOCYTES NFR BLD: 11 %
MONOCYTES # BLD: 1.89 X10(3) UL (ref 0.1–1)
MONOCYTES NFR BLD: 7 %
MYELOCYTES # BLD: 0.54 X10(3) UL
MYELOCYTES NFR BLD: 2 %
NEUTROPHILS # BLD AUTO: 15.83 X10 (3) UL (ref 1.5–7.7)
NEUTROPHILS NFR BLD: 58 %
NEUTS BAND NFR BLD: 16 %
NEUTS HYPERSEG # BLD: 19.98 X10(3) UL (ref 1.5–7.7)
OSMOLALITY SERPL CALC.SUM OF ELEC: 285 MOSM/KG (ref 275–295)
PHOSPHATE SERPL-MCNC: 1.8 MG/DL (ref 2.5–4.9)
PLATELET # BLD AUTO: 283 10(3)UL (ref 150–450)
PLATELET MORPHOLOGY: NORMAL
POTASSIUM SERPL-SCNC: 4 MMOL/L (ref 3.5–5.1)
RBC # BLD AUTO: 2.95 X10(6)UL (ref 3.8–5.3)
SODIUM SERPL-SCNC: 136 MMOL/L (ref 136–145)
TOTAL CELLS COUNTED: 100
WBC # BLD AUTO: 27 X10(3) UL (ref 4–11)

## 2020-01-17 RX ORDER — FUROSEMIDE 10 MG/ML
20 INJECTION INTRAMUSCULAR; INTRAVENOUS ONCE
Status: COMPLETED | OUTPATIENT
Start: 2020-01-17 | End: 2020-01-17

## 2020-01-17 NOTE — PROGRESS NOTES
[facility]    Nephrology Progress Note      SUBJECTIVE:     Good UOP overnight. Tolerated Lasix yesterday.     PHYSICAL EXAM:     Vital Signs: /58 (BP Location: Right arm)   Pulse 96   Temp 97.6 °F (36.4 °C) (Temporal)   Resp 20   Ht 01/17/2020    NEPERCENT 90.6 12/20/2019    LYPERCENT 3.5 12/20/2019    MOPERCENT 4.6 12/20/2019    EOPERCENT 0.0 12/20/2019    BAPERCENT 0.1 12/20/2019    NE 12.31 (H) 12/20/2019    LYMABS 0.48 (L) 12/20/2019    MOABSO 0.62 12/20/2019    EOABSO 0.00 12/20/ (TYLENOL) tab 650 mg, 650 mg, Oral, Q4H PRN  ondansetron HCl (ZOFRAN) injection 4 mg, 4 mg, Intravenous, Q6H PRN  influenza vaccine (PF)(FLUZONE HD) high dose for 65 yrs & older inj 0.5ml, 0.5 mL, Intramuscular, Prior to discharge  nicotine (NICODERM CQ) 2

## 2020-01-17 NOTE — PROGRESS NOTES
Pulmonary Progress Note     Assessment / Plan:  1.  Acute hypoxemic respiratory failure - due to lung mass, postobstructive PNA with associated large cavitary lesion   - wean supplemental O2 as able, significantly improved, down to 4lpm  - OOB to chair as t TempSrc:    Temporal   SpO2:  95% 92% 97%   Weight: 151 lb 6.4 oz (68.7 kg)      Height:         Physical Exam:  General - nad  Respiratory - diminished at the bases  Chest - right chest drain in place  Cardiovascular - regular rate and rhythm, no MRG  A

## 2020-01-17 NOTE — PROGRESS NOTES
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

## 2020-01-17 NOTE — PROGRESS NOTES
1200 Chest drain bag emptied 10cc thick mucus consistency or gelatinous. Concern that tube may not be draining well with thick mucus coming from inside drain, awaiting clarification orders as drain is to bag, gravity now.  Discussed with Reji Larsen RN a

## 2020-01-17 NOTE — PROGRESS NOTES
Dr. Blank Mcbride at bedside. Aware of change in chest drain overnight from purulent to bloody in color. Dr. Blank Mcbride aware that reported from [place] [address] Simone Pineda RN patient had \"dime size blood clot\" that was coughed up. Dr. Blank Mcbride discussed with patient.

## 2020-01-17 NOTE — PLAN OF CARE
VSS. Sinus tachycardia- MDaware. Pt had one episode of coughing up blood- notified MD. Hold AM heparin. CT to drainage bag. Dressings changed. Mepilex to bottom. Specialty bed. Will continue to monitor.        Problem: Patient Centered Care  Goal: Patien cough, deep breathe, Incentive Spirometry  - Assess the need for suctioning and perform as needed  - Assess and instruct to report SOB or any respiratory difficulty  - Respiratory Therapy support as indicated  - Manage/alleviate anxiety  - Monitor for sign Progressing     Problem: SAFETY ADULT - FALL  Goal: Free from fall injury  Description  INTERVENTIONS:  - Assess pt frequently for physical needs  - Identify cognitive and physical deficits and behaviors that affect risk of falls.   - [place] fall precaut from home; allow for food preferences  - Enhance eating environment  Outcome: Progressing

## 2020-01-17 NOTE — PROGRESS NOTES
[facility] Hospitalist Progress Note     CC: Hospital Follow up    PCP: None Pcp     Assessment/Plan:     Yoshi Goldstein 66year old female with PMH sig- unknown (hasn't seen a doctor in years) nicotine use x years here with SOB and leg swelling, found to have l status    Depression/anxiety  - psych liaison consulted  - not open to seeing psychiatrist at this point  - consider starting remeron, if appetite allows    Pancytopenia: neutropenia and thrombocytopenia resolved, hit neg  - received neuopogen  - anemia pe lb 14.5 oz (74.8 kg)  01/04/20 0600 : 162 lb 9.6 oz (73.8 kg)  01/01/20 0500 : 165 lb 9.6 oz (75.1 kg)  12/31/19 1100 : 173 lb (78.5 kg)  12/28/19 0700 : 162 lb 4.8 oz (73.6 kg)  12/25/19 0516 : 166 lb 8 oz (75.5 kg)  12/24/19 0357 : 166 lb 12.8 oz (75.7 k • silver sulfADIAZINE   Topical BID   • piperacillin-tazobactam  3.375 g Intravenous Q8H   • vancomycin HCl  125 mg Oral Daily   • allopurinol  300 mg Oral BID   • Fluticasone Furoate-Vilanterol  1 puff Inhalation Daily   • nicotine  1 patch Transdermal

## 2020-01-17 NOTE — DIETARY NOTE
ADULT NUTRITION UPDATE/RE-ASSESSMENT 1/17    Recommend: Replace phos if less than 2.0 mg/dl.    NUTRITION DIAGNOSIS/PROBLEM:  Inadequate oral intake related to decreased ability to consume sufficient energy as evidenced by no appetite per pt and ~25-50% o today for chest tube today and now to drink Ensure Enlive and asked for X2 ice-cream. Encouraging po intake and if nothing else to drink the supplements to avoid feeding tube. 1/6 update: Po intake appears poor (see intake below).  Pt cannot remember what unable to take solid foods. Weight loss in part d/t negative energy balance. ANTHROPOMETRICS:  HT: 157.5 cm (5' 2\")  WT: 68.7 kg (151 lb 6.4 oz) -- (admit wt 163 lbs) true wt lower noting edema.    Wt Readings from Last 20 Encounters:  01/17/20 : 68.7 kg

## 2020-01-18 LAB
ALBUMIN SERPL-MCNC: 1.6 G/DL (ref 3.4–5)
ANION GAP SERPL CALC-SCNC: 7 MMOL/L (ref 0–18)
BUN BLD-MCNC: 16 MG/DL (ref 7–18)
BUN/CREAT SERPL: 15.4 (ref 10–20)
CALCIUM BLD-MCNC: 7.6 MG/DL (ref 8.5–10.1)
CHLORIDE SERPL-SCNC: 102 MMOL/L (ref 98–112)
CO2 SERPL-SCNC: 29 MMOL/L (ref 21–32)
CREAT BLD-MCNC: 1.04 MG/DL (ref 0.55–1.02)
GLUCOSE BLD-MCNC: 88 MG/DL (ref 70–99)
HAV IGM SER QL: 2.1 MG/DL (ref 1.6–2.6)
OSMOLALITY SERPL CALC.SUM OF ELEC: 287 MOSM/KG (ref 275–295)
PHOSPHATE SERPL-MCNC: 3.1 MG/DL (ref 2.5–4.9)
PHOSPHATE SERPL-MCNC: 3.7 MG/DL (ref 2.5–4.9)
POTASSIUM SERPL-SCNC: 3.8 MMOL/L (ref 3.5–5.1)
SODIUM SERPL-SCNC: 138 MMOL/L (ref 136–145)

## 2020-01-18 RX ORDER — IPRATROPIUM BROMIDE AND ALBUTEROL SULFATE 2.5; .5 MG/3ML; MG/3ML
3 SOLUTION RESPIRATORY (INHALATION) EVERY 4 HOURS PRN
Qty: 180 ML | Refills: 1 | Status: SHIPPED | OUTPATIENT
Start: 2020-01-18

## 2020-01-18 RX ORDER — POTASSIUM CHLORIDE 14.9 MG/ML
20 INJECTION INTRAVENOUS ONCE
Status: COMPLETED | OUTPATIENT
Start: 2020-01-18 | End: 2020-01-18

## 2020-01-18 RX ORDER — FUROSEMIDE 10 MG/ML
20 INJECTION INTRAMUSCULAR; INTRAVENOUS ONCE
Status: COMPLETED | OUTPATIENT
Start: 2020-01-18 | End: 2020-01-18

## 2020-01-18 RX ORDER — IPRATROPIUM BROMIDE AND ALBUTEROL SULFATE 2.5; .5 MG/3ML; MG/3ML
3 SOLUTION RESPIRATORY (INHALATION) 2 TIMES DAILY
Qty: 180 ML | Refills: 1 | Status: SHIPPED | OUTPATIENT
Start: 2020-01-18

## 2020-01-18 NOTE — PROGRESS NOTES
Pulmonary Progress Note        NAME: Chris Florentino - ROOM: 88659-X - MRN: [id number] - Age: 66year old - : 9/15/1945        Last 24hrs: No events overnight, resting comfortably, no hemoptysis noted overnight or this AM    OBJECTIVE:   20  040 1.6*       Invalid input(s): ARTERIALPH, ARTERIALPO2, ARTERIALPCO2, ARTERIALHCO3    No results for input(s): BNP in the last 72 hours.     Invalid input(s): TROPI    INR   Date/Time Value Ref Range Status   12/16/2019 05:08 AM 0.94 0.90 - 1.20 Final     Com yu prn, minimize IVF's if possible  13. Dispo  - full code, palliative care recs appreciated  - LTACH planning.  Anticipate need for prolonged antibiotics and chest tube to drainage bag for a couple of weeks after which it can be removed per thoracic marisa

## 2020-01-18 NOTE — PROGRESS NOTES
Handoff report given to receiving RN, all questions answered. Patient resting comfortably in bed. All lines working appropriately. Patient alert and oriented in no acute distress.

## 2020-01-18 NOTE — PLAN OF CARE
Problem: Patient Centered Care  Goal: Patient preferences are identified and integrated in the patient's plan of care  Description  Interventions:  - What would you like us to know as we care for you? I haven't been to a doctor in a long time.   I like to Manage/alleviate anxiety  - Monitor for signs/symptoms of CO2 retention  Outcome: Progressing     Problem: CARDIOVASCULAR - ADULT  Goal: Maintains optimal cardiac output and hemodynamic stability  Description  INTERVENTIONS:  - Monitor vital signs, rhythm, of falls.   - [place] fall precautions as indicated by assessment.  - Educate pt/family on patient safety including physical limitations  - Instruct pt to call for assistance with activity based on assessment  - Modify environment to reduce risk of injury

## 2020-01-18 NOTE — PROGRESS NOTES
32 [facility] Infectious Disease Progress Note    Jewell Allen Patient Status:  Inpatient    9/15/1945 MRN [id number]   Location [facility] 2W/SW Attending Caleb Vidal MD   Hosp Day # 35 PCP None Pcp     Subjective:  Patient seen and 0.5 mL, Intramuscular, Prior to discharge  •  nicotine (NICODERM CQ) 21 MG/24HR 1 patch, 1 patch, Transdermal, Daily  •  hydrALAzine HCl (APRESOLINE) injection 10 mg, 10 mg, Intravenous, Q6H PRN    Physical Exam:  General: Alert, orientated x3.   Cooperativ Recent sputum with pseudomonas x2  - AFB isolation with sputums negative thus far, MTB PCR negative  - S/P CT guided pigtail chest tube drainage of abscess on 1/2 - cultures again with pseudomonas  - Fungal antibodies negative, aspergillus galactomannan ne

## 2020-01-18 NOTE — PROGRESS NOTES
[facility] Hospitalist Progress Note     CC: Hospital Follow up    PCP: None Pcp     Assessment/Plan:     Kelvin Mason 66year old female with PMH sig- unknown (hasn't seen a doctor in years) nicotine use x years here with SOB and leg swelling, found to have l consider starting remeron, if appetite allows    Pancytopenia: neutropenia and thrombocytopenia resolved, hit neg  - received neuopogen  - anemia persistent, 2/2 chemo and AOCD  - hgb 8.9, cont to trend    Herpetic lesion lip  - given valtrex     MEAGHAN   - s kg)  12/20/19 1210 : 178 lb 4.8 oz (80.9 kg)  12/20/19 0817 : 177 lb 1.6 oz (80.3 kg)  12/19/19 1126 : 171 lb 11.2 oz (77.9 kg)  12/17/19 1304 : 162 lb 14.7 oz (73.9 kg)  12/14/19 2216 : 163 lb (73.9 kg)  12/14/19 1522 : 170 lb (77.1 kg)    Exam  GEN: fema acetaminophen **OR** [DISCONTINUED] HYDROcodone-acetaminophen **OR** [DISCONTINUED] HYDROcodone-acetaminophen, ondansetron HCl, influenza virus vaccine PF, hydrALAzine HCl

## 2020-01-18 NOTE — PROGRESS NOTES
[facility]    Nephrology Progress Note      SUBJECTIVE:     On 4L NC  Denies SOB, reports cough, denies apin      PHYSICAL EXAM:     Vital Signs: /54   Pulse 92   Temp 97.2 °F (36.2 °C) (Temporal)   Resp 21   Ht 5' 2\" (1.575 m)   Wt LYPERCENT 3.5 12/20/2019    MOPERCENT 4.6 12/20/2019    EOPERCENT 0.0 12/20/2019    BAPERCENT 0.1 12/20/2019    NE 12.31 (H) 12/20/2019    LYMABS 0.48 (L) 12/20/2019    MOABSO 0.62 12/20/2019    EOABSO 0.00 12/20/2019    BAABSO 0.02 12/20/2019     Lab R PRN  ondansetron HCl (ZOFRAN) injection 4 mg, 4 mg, Intravenous, Q6H PRN  influenza vaccine (PF)(FLUZONE HD) high dose for 65 yrs & older inj 0.5ml, 0.5 mL, Intramuscular, Prior to discharge  nicotine (NICODERM CQ) 21 MG/24HR 1 patch, 1 patch, Transdermal,

## 2020-01-18 NOTE — CM/SW NOTE
Case Management/ Progression of Care    CM received call from [address] [place] [address], [facility]  [facility],  [facility] however this CM confirmed with Alex Dalton, Bed Placement cannot be arranged until Monday 1/20/2020 as

## 2020-01-19 ENCOUNTER — APPOINTMENT (OUTPATIENT)
Dept: ULTRASOUND IMAGING | Age: 75
DRG: 177 | End: 2020-01-19
Attending: HOSPITALIST
Payer: MEDICARE

## 2020-01-19 ENCOUNTER — APPOINTMENT (OUTPATIENT)
Dept: ULTRASOUND IMAGING | Facility: HOSPITAL | Age: 75
DRG: 177 | End: 2020-01-19
Attending: HOSPITALIST
Payer: MEDICARE

## 2020-01-19 LAB
ALBUMIN SERPL-MCNC: 1.6 G/DL (ref 3.4–5)
ANION GAP SERPL CALC-SCNC: 5 MMOL/L (ref 0–18)
BUN BLD-MCNC: 18 MG/DL (ref 7–18)
BUN/CREAT SERPL: 18.4 (ref 10–20)
CALCIUM BLD-MCNC: 7.9 MG/DL (ref 8.5–10.1)
CHLORIDE SERPL-SCNC: 103 MMOL/L (ref 98–112)
CO2 SERPL-SCNC: 29 MMOL/L (ref 21–32)
CREAT BLD-MCNC: 0.98 MG/DL (ref 0.55–1.02)
GLUCOSE BLD-MCNC: 93 MG/DL (ref 70–99)
HAV IGM SER QL: 2 MG/DL (ref 1.6–2.6)
OSMOLALITY SERPL CALC.SUM OF ELEC: 286 MOSM/KG (ref 275–295)
PHOSPHATE SERPL-MCNC: 2.2 MG/DL (ref 2.5–4.9)
POTASSIUM SERPL-SCNC: 4 MMOL/L (ref 3.5–5.1)
PROCALCITONIN SERPL-MCNC: 0.54 NG/ML
SODIUM SERPL-SCNC: 137 MMOL/L (ref 136–145)

## 2020-01-19 PROCEDURE — 93971 EXTREMITY STUDY: CPT | Performed by: HOSPITALIST

## 2020-01-19 RX ORDER — FUROSEMIDE 10 MG/ML
20 INJECTION INTRAMUSCULAR; INTRAVENOUS ONCE
Status: COMPLETED | OUTPATIENT
Start: 2020-01-19 | End: 2020-01-19

## 2020-01-19 NOTE — PLAN OF CARE
Problem: Patient Centered Care  Goal: Patient preferences are identified and integrated in the patient's plan of care  Description  Interventions:  - What would you like us to know as we care for you? I haven't been to a doctor in a long time.   I like to Manage/alleviate anxiety  - Monitor for signs/symptoms of CO2 retention  Outcome: Progressing     Problem: COPING  Goal: Pt/Family able to verbalize concerns and demonstrate effective coping strategies  Description  INTERVENTIONS:  - Assist patient/family of falls.   - [place] fall precautions as indicated by assessment.  - Educate pt/family on patient safety including physical limitations  - Instruct pt to call for assistance with activity based on assessment  - Modify environment to reduce risk of injury document risk factors for pressure ulcer development  - Assess and document skin integrity  - Monitor for areas of redness and/or skin breakdown  - Initiate interventions, skin care algorithm/standards of care as needed  Outcome: Progressing  Goal: Incisio during daily activities to promote function  Outcome: Progressing     Problem: GASTROINTESTINAL - ADULT  Goal: Maintains adequate nutritional intake (undernourished)  Description  INTERVENTIONS:  - Monitor percentage of each meal consumed  - Identify facto

## 2020-01-19 NOTE — PROGRESS NOTES
Pulmonary Progress Note        NAME: Tequila Tubbs - ROOM: 658/392-E - MRN: [id number] - Age: 66year old - : 9/15/1945        Last 24hrs: more awake today, denies hemoptysis    OBJECTIVE:   20  0200 20  0300 20  0400 20  0600 1. 6* 1.7* 1.6*       Invalid input(s): ARTERIALPH, ARTERIALPO2, ARTERIALPCO2, ARTERIALHCO3    No results for input(s): BNP in the last 72 hours.     Invalid input(s): TROPI    INR   Date/Time Value Ref Range Status   12/16/2019 05:08 AM 0.94 0.90 - 1.20 Fin minimize IVF's if possible  13. Dispo  - full code, palliative care recs appreciated  - LTACH planning.  Anticipate need for prolonged antibiotics and chest tube to drainage bag for a couple of weeks after which it can be removed per thoracic surgery  -stab

## 2020-01-19 NOTE — PROGRESS NOTES
[facility]    Nephrology Progress Note      SUBJECTIVE:     Still has cough, on 2L NC     PHYSICAL EXAM:     Vital Signs: /59 (BP Location: Right arm)   Pulse 95   Temp 97.3 °F (36.3 °C) (Temporal)   Resp 12   Ht 5' 2\" (1.575 m)   Wt NEPERCENT 90.6 12/20/2019    LYPERCENT 3.5 12/20/2019    MOPERCENT 4.6 12/20/2019    EOPERCENT 0.0 12/20/2019    BAPERCENT 0.1 12/20/2019    NE 12.31 (H) 12/20/2019    LYMABS 0.48 (L) 12/20/2019    MOABSO 0.62 12/20/2019    EOABSO 0.00 12/20/2019    BAABSO mg, 650 mg, Oral, Q4H PRN  ondansetron HCl (ZOFRAN) injection 4 mg, 4 mg, Intravenous, Q6H PRN  influenza vaccine (PF)(FLUZONE HD) high dose for 65 yrs & older inj 0.5ml, 0.5 mL, Intramuscular, Prior to discharge  nicotine (NICODERM CQ) 21 MG/24HR 1 patch,

## 2020-01-19 NOTE — PROGRESS NOTES
[facility] Hospitalist Progress Note     CC: Hospital Follow up    PCP: None Pcp     Assessment/Plan:     Jewell Allen 66year old female with PMH sig- unknown (hasn't seen a doctor in years) nicotine use x years here with SOB and leg swelling, found to have l psychiatrist at this point  - consider starting remeron, if appetite allows    Pancytopenia: neutropenia and thrombocytopenia resolved, hit neg  - received neuopogen  - anemia persistent, 2/2 chemo and AOCD  - hgb 8.9, cont to trend    Herpetic lesion lip 1311 : 174 lb 6.1 oz (79.1 kg)  12/20/19 1210 : 178 lb 4.8 oz (80.9 kg)  12/20/19 0817 : 177 lb 1.6 oz (80.3 kg)  12/19/19 1126 : 171 lb 11.2 oz (77.9 kg)  12/17/19 1304 : 162 lb 14.7 oz (73.9 kg)  12/14/19 2216 : 163 lb (73.9 kg)  12/14/19 1522 : 170 lb ( Flush, acetaminophen **OR** [DISCONTINUED] HYDROcodone-acetaminophen **OR** [DISCONTINUED] HYDROcodone-acetaminophen, ondansetron HCl, influenza virus vaccine PF, hydrALAzine HCl

## 2020-01-20 ENCOUNTER — APPOINTMENT (OUTPATIENT)
Dept: GENERAL RADIOLOGY | Facility: HOSPITAL | Age: 75
DRG: 177 | End: 2020-01-20
Attending: INTERNAL MEDICINE
Payer: MEDICARE

## 2020-01-20 VITALS
OXYGEN SATURATION: 93 % | RESPIRATION RATE: 16 BRPM | DIASTOLIC BLOOD PRESSURE: 47 MMHG | SYSTOLIC BLOOD PRESSURE: 105 MMHG | WEIGHT: 151.38 LBS | HEIGHT: 62 IN | BODY MASS INDEX: 27.86 KG/M2 | HEART RATE: 93 BPM | TEMPERATURE: 98 F

## 2020-01-20 LAB
ALBUMIN SERPL-MCNC: 1.7 G/DL (ref 3.4–5)
ANION GAP SERPL CALC-SCNC: 4 MMOL/L (ref 0–18)
BUN BLD-MCNC: 20 MG/DL (ref 7–18)
BUN/CREAT SERPL: 22 (ref 10–20)
CALCIUM BLD-MCNC: 7.6 MG/DL (ref 8.5–10.1)
CHLORIDE SERPL-SCNC: 102 MMOL/L (ref 98–112)
CO2 SERPL-SCNC: 30 MMOL/L (ref 21–32)
CREAT BLD-MCNC: 0.91 MG/DL (ref 0.55–1.02)
DEPRECATED RDW RBC AUTO: 65.2 FL (ref 35.1–46.3)
ERYTHROCYTE [DISTWIDTH] IN BLOOD BY AUTOMATED COUNT: 19.2 % (ref 11–15)
GLUCOSE BLD-MCNC: 102 MG/DL (ref 70–99)
HAV IGM SER QL: 1.9 MG/DL (ref 1.6–2.6)
HCT VFR BLD AUTO: 28.2 % (ref 35–48)
HGB BLD-MCNC: 8.8 G/DL (ref 12–16)
MCH RBC QN AUTO: 30.2 PG (ref 26–34)
MCHC RBC AUTO-ENTMCNC: 31.2 G/DL (ref 31–37)
MCV RBC AUTO: 96.9 FL (ref 80–100)
OSMOLALITY SERPL CALC.SUM OF ELEC: 285 MOSM/KG (ref 275–295)
PHOSPHATE SERPL-MCNC: 1.9 MG/DL (ref 2.5–4.9)
PLATELET # BLD AUTO: 295 10(3)UL (ref 150–450)
POTASSIUM SERPL-SCNC: 4.1 MMOL/L (ref 3.5–5.1)
RBC # BLD AUTO: 2.91 X10(6)UL (ref 3.8–5.3)
SODIUM SERPL-SCNC: 136 MMOL/L (ref 136–145)
WBC # BLD AUTO: 29.5 X10(3) UL (ref 4–11)

## 2020-01-20 PROCEDURE — 71045 X-RAY EXAM CHEST 1 VIEW: CPT | Performed by: INTERNAL MEDICINE

## 2020-01-20 RX ORDER — VORICONAZOLE 40 MG/ML
200 POWDER, FOR SUSPENSION ORAL EVERY 12 HOURS SCHEDULED
Qty: 30 ML | Refills: 0 | Status: SHIPPED | OUTPATIENT
Start: 2020-01-20 | End: 2020-01-23

## 2020-01-20 RX ORDER — BISACODYL 10 MG
10 SUPPOSITORY, RECTAL RECTAL
Qty: 30 SUPPOSITORY | Refills: 0 | Status: SHIPPED | OUTPATIENT
Start: 2020-01-20

## 2020-01-20 RX ORDER — ALLOPURINOL 300 MG/1
300 TABLET ORAL 2 TIMES DAILY
Qty: 60 TABLET | Refills: 0 | Status: SHIPPED | OUTPATIENT
Start: 2020-01-20

## 2020-01-20 NOTE — PLAN OF CARE
Report given to [facility] RN at [facility]. No further questions at this time.  Pt is transferring with belonging and radiology discs per [facility] request.

## 2020-01-20 NOTE — PROGRESS NOTES
[facility]    Nephrology Progress Note      SUBJECTIVE:     On 3L today, denies SOB, continues to have cough      PHYSICAL EXAM:     Vital Signs: /51 (BP Location: Right arm)   Pulse 94   Temp 97 °F (36.1 °C) (Temporal)   Resp 16   Ht NEPERCENT 90.6 12/20/2019    LYPERCENT 3.5 12/20/2019    MOPERCENT 4.6 12/20/2019    EOPERCENT 0.0 12/20/2019    BAPERCENT 0.1 12/20/2019    NE 12.31 (H) 12/20/2019    LYMABS 0.48 (L) 12/20/2019    MOABSO 0.62 12/20/2019    EOABSO 0.00 12/20/2019    BAABSO PRN  acetaminophen (TYLENOL) tab 650 mg, 650 mg, Oral, Q4H PRN  ondansetron HCl (ZOFRAN) injection 4 mg, 4 mg, Intravenous, Q6H PRN  influenza vaccine (PF)(FLUZONE HD) high dose for 65 yrs & older inj 0.5ml, 0.5 mL, Intramuscular, Prior to discharge  renut

## 2020-01-20 NOTE — PLAN OF CARE
Laura Mckenzie was discharged to [facility] this afternoon. Chest tube remains in place to gravity with minimal output. Report given to [facility] at [facility]. Pt [facility] ambulance with no s/s of distress upon discharge.        Problem: Patient Centered Care  Goal: Alicia Gardiner broncho-pulmonary hygiene including cough, deep breathe, Incentive Spirometry  - Assess the need for suctioning and perform as needed  - Assess and instruct to report SOB or any respiratory difficulty  - Respiratory Therapy support as indicated  - Manage/a electrolytes and administer replacement therapy as ordered  Outcome: Adequate for Discharge     Problem: SAFETY ADULT - FALL  Goal: Free from fall injury  Description  INTERVENTIONS:  - Assess pt frequently for physical needs  - Identify cognitive and phys values  - Obtain nutritional consult as needed  - Optimize oral hygiene and moisture  - Encourage food from home; allow for food preferences  - Enhance eating environment  Outcome: Adequate for Discharge     Problem: METABOLIC/FLUID AND ELECTROLYTES - ADUL Assess patient's functional ability and stability  - Promote increasing activity/tolerance for mobility and gait  - Educate and engage patient/family in tolerated activity level and precautions    Outcome: Adequate for Discharge     Problem: Impaired Activ

## 2020-01-20 NOTE — PLAN OF CARE
Double RN skin check done prior to transfer off Unit. Skin check performed by this RN and Julian RN. Wounds are as follows: Left upper buttocks, Left hip skin tear, skin tear to perineum and right labia.       Will remain available for any further questi

## 2020-01-20 NOTE — PLAN OF CARE
Problem: Patient Centered Care  Goal: Patient preferences are identified and integrated in the patient's plan of care  Description  Interventions:  - What would you like us to know as we care for you? I haven't been to a doctor in a long time.   I like to instruct to report SOB or any respiratory difficulty  - Respiratory Therapy support as indicated  - Manage/alleviate anxiety  - Monitor for signs/symptoms of CO2 retention  Outcome: Progressing     Problem: COPING  Goal: Pt/Family able to verbalize concern frequently for physical needs  - Identify cognitive and physical deficits and behaviors that affect risk of falls.   - [place] fall precautions as indicated by assessment.  - Educate pt/family on patient safety including physical limitations  - Instruct p AND ELECTROLYTES - ADULT  Goal: Electrolytes maintained within normal limits  Description  INTERVENTIONS:  - Monitor labs and rhythm and assess patient for signs and symptoms of electrolyte imbalances  - Administer electrolyte replacement as ordered  - Mon highest/safest level of independence in self care  Description  Interventions:  - Assess ability and encourage patient to participate in ADLs to maximize function  - Promote sitting position while performing ADLs such as feeding, grooming, and bathing  - E

## 2020-01-20 NOTE — PROGRESS NOTES
Pulmonary Progress Note     Assessment / Plan:  1.  Acute hypoxemic respiratory failure - due to lung mass, postobstructive PNA with associated large cavitary lesion   - repeat CXR this morning  - wean O2 as tolerated  - OOB to chair as tolerated and bronch Physical Exam:  General - nad  Respiratory - clear bilaterally, right chest drain in place  Cardiovascular - regular rate and rhythm, no MRG  Abdo - soft, NTND  Ext - no edema  Mental status - interactive and answering questions appropriately    Med

## 2020-01-20 NOTE — PROGRESS NOTES
32 [facility] Infectious Disease Progress Note    Perla Mariscal Patient Status:  Inpatient    9/15/1945 MRN [id number]   Location [facility] 2W/SW Attending Sofia Carter MD   Hosp Day # 39 PCP None Pcp     Subjective:  Patient seen and patch, Transdermal, Daily  •  hydrALAzine HCl (APRESOLINE) injection 10 mg, 10 mg, Intravenous, Q6H PRN    Physical Exam:  General: Alert, orientated x3. Cooperative. No apparent distress. Vital Signs:  Blood pressure 115/65, pulse 107, temperature 97. 6 negative  - S/P CT guided pigtail chest tube drainage of abscess on 1/2 - cultures again with pseudomonas  - Fungal antibodies negative, aspergillus galactomannan negative  - Zosyn and voriconazole ongoing - at least day #24 of this regimen     2.  Newly d

## 2020-01-20 NOTE — CM/SW NOTE
Discharge Plan:    RML at 2pm today  RN to call Report: [phone]  [facility] at [phone]    Left message for daughter Lauren Okeefe [phone] and called son in law Princeton [phone]. Pt and family agreeable to plan.     CM asked RML saul

## 2020-01-20 NOTE — DISCHARGE SUMMARY
[facility] Internal Medicine Discharge Summary   Patient ID:  Ash Crain  [id number]  83 year old  9/15/1945    Admit date: 12/14/2019    Discharge date and time: 1/20/2020     Attending Physician: Sarah Clark MD     Primary Care Physician: None Pcp 66year old female with PMH sig- unknown (hasn't seen a doctor in years) nicotine use x years here with SOB and leg swelling, found to have lung mass, s/p biopsy, diagnosed with small cell lung cancer. S/p 1st cycle of chemotherapy 12/19-12/21.  [facility] co - not open to seeing psychiatrist at this point  - consider starting remeron, if appetite allows     Pancytopenia: neutropenia and thrombocytopenia resolved, hit neg  - received neuopogen  - anemia persistent, 2/2 chemo and AOCD       Herpetic lesion lip * This list has 2 medication(s) that are the same as other medications prescribed for you. Read the directions carefully, and ask your doctor or other care provider to review them with you.                Where to Get Your Medications      These medication PROCEDURE: CT CHEST (CPT=71250)  COMPARISON: [facility], CT CHEST (CPT=71250), 12/29/2019, 10:47. [facility], CT CHEST(CONTRAST ONLY) (CPT=71260), 12/14/2019, 20:35. INDICATIONS: Small cell lung cancer.  Evaulate RUL cavita studies. PLEURA:   Small bilateral right greater than left pleural effusions mildly increased in size. CHEST WALL: No axillary mass or enlarged adenopathy. LIMITED ABDOMEN: Limited images of the upper abdomen are not significantly changed.  BONES:   Spond PROCEDURE: CT CHEST (CPT=71250)  COMPARISON: [facility], XR CHEST AP PORTABLE (CPT=71045), 12/29/2019, 6:30. [facility], CT CHEST(CONTRAST ONLY) (CPT=71260), 12/14/2019, 20:35.   INDICATIONS: Right upper lobe cavitary lesion CONCLUSION:  1. Necrotizing cavitary pneumonia in the right upper lobe obscuring the previously seen pulmonary malignancy -main considerations bacterial , fungal and TB.  2. Metastatic mediastinal and hilar lymphadenopathy not well evaluated without contras PROCEDURE: US VENOUS DOPPLER ARM LEFT-[facility] (AJA=76185)  COMPARISON: [address] [place] [address]-[facility] (CPT=93970), 1/12/2020, 9:07.   INDICATIONS: LEft arm swelling  TECHNIQUE: Color duplex Doppler venous ultrasound of the PROCEDURE: CT GUIDED PERCUTANEOUS DRAIN PLACEMENT INTO RIGHT PULMONARY ABSCESS (CPT=10030)  INDICATIONS: 49-year-old woman with lung cancer and right upper lobe postobstructive pneumonia resulting in abscess/cavitation.   : MD Clara  ANESTHESIA CONCLUSION:  1. Successful CT-guided percutaneous drain placement into right upper lobe pulmonary abscess. Sample serosanguineous aspirate has been submitted for microbiologic analysis.  2. Intrapulmonary abscess drain is to remain attached to suction thro PROCEDURE: XR CHEST AP PORTABLE (CPT=71010) TIME: 1141. COMPARISON: [facility], CT CHEST (CPT=71250), 1/09/2020, 9:44. [facility], XR CHEST AP PORTABLE (CPT=71045), 1/06/2020, 6:42.   [facility], XR CHEST A PROCEDURE: XR CHEST AP PORTABLE (CPT=71010) TIME: 1382. COMPARISON: [facility], CT CHEST (CPT=71250), 12/29/2019, 10:47. [facility], XR CHEST AP PORTABLE (CPT=71045), 1/05/2020, 6:42. INDICATIONS: Follow up chest tube. PROCEDURE: XR CHEST AP PORTABLE (CPT=71010) TIME: 0647 hours. COMPARISON: [facility], XR CHEST AP PORTABLE (CPT=71045), 1/04/2020, 7:01. INDICATIONS: f/u chest tube  TECHNIQUE:   Single view.    FINDINGS:  CARDIAC/VASC: No cardiac silhoue PROCEDURE: XR CHEST AP PORTABLE (CPT=71010) TIME:   0701 hours  COMPARISON: [facility], CT CHEST (CPT=71250), 12/29/2019, 10:47. [facility], XR CHEST AP PORTABLE (CPT=71045), 12/29/2019, 6:30.   INDICATIONS: Follow up increa PROCEDURE: XR CHEST AP PORTABLE (CPT=71010) TIME: 630. COMPARISON: [facility], CT CHEST(CONTRAST ONLY) (CPT=71260), 12/14/2019, 20:35. [facility], XR CHEST AP PORTABLE (CPT=71045), 12/23/2019, 4:02.   [facility] PROCEDURE: XR CHEST AP PORTABLE (CPT=71010) TIME: 04:02  COMPARISON: [facility], CT CHEST(CONTRAST ONLY) (CPT=71260), 12/14/2019, 20:35. [facility], XR CHEST AP PORTABLE (CPT=71045), 12/22/2019, 9:19.   INDICATIONS: [place] PROCEDURE: XR CHEST AP PORTABLE (CPT=71010) TIME: 7368. COMPARISON: [facility], CT CHEST(CONTRAST ONLY) (CPT=71260), 12/14/2019, 20:35. [facility], XR CHEST AP PORTABLE (CPT=71045), 12/14/2019, 19:18.   INDICATIONS: Hypoxi BRONCHOSCOPY (N/A)  ENDOBRONCHIAL ULTRASOUND (EBUS) (N/A)       Total Time Coordinating Care: > than 30 minutes    Patient had opportunity to ask questions and state understand and agree with therapeutic plan as outlined      Baron Paul MD  [facility]

## 2020-01-20 NOTE — PROGRESS NOTES
32 [facility] Infectious Disease  Progress Note    Sujata Hart Patient Status:  Inpatient    9/15/1945 MRN [id number]   Location [facility] 2W/SW Attending Julian Alexander MD   Hosp Day # 40 PCP None Pcp     Subjective:  Patient seen chemotherapy for recently diagnosed SSLCa  - Patient with SVC syndrome, known RUL/hilar mass  - Repeat CT with necrotizing cavitary pneumonia in RUL concerning for bacterial, fungal, TB  - Recent sputum with pseudomonas x2  - AFB isolation with sputums neg

## 2021-08-20 NOTE — PLAN OF CARE
VSS. A/o x 4. On Vapotherm 30L, FiO2 70%. Right anterior chest tube to -20cm suction, intact and draining. Denies pain. Pt felt too weak after sitting in chair and required lift to get back in bed from chair. Poor appetite, but tries to drink Ensure.  Loose saturation or ABGs  - Provide Smoking Cessation handout, if applicable  - Encourage broncho-pulmonary hygiene including cough, deep breathe, Incentive Spirometry  - Assess the need for suctioning and perform as needed  - Assess and instruct to report SOB o for life threatening arrhythmias  - Monitor electrolytes and administer replacement therapy as ordered  Outcome: Progressing     Problem: SAFETY ADULT - FALL  Goal: Free from fall injury  Description  INTERVENTIONS:  - Assess pt frequently for physical nee Skin normal color for race, warm, dry and intact. No evidence of rash.

## 2024-02-18 NOTE — PROGRESS NOTES
32 [facility] Infectious Disease  Progress Note    Yoshi Goldstein Patient Status:  Inpatient    9/15/1945 MRN [id number]   Location [facility] 2W/SW Attending Nimesh Edwards MD   Hosp Day # 28 PCP None Pcp     Subjective:  Patient s regimen     Assessment and Plan:     1.  Worsening pulmonary status in immunocompromised patient s/p initiation of chemotherapy for recently diagnosed SSLCa  - Patient with SVC syndrome, known RUL/hilar mass  - Repeat CT with necrotizing cavitary pneumonia Female

## (undated) DEVICE — SINGLE USE SUCTION VALVE MAJ-209: Brand: SINGLE USE SUCTION VALVE (STERILE)

## (undated) DEVICE — MEDI-VAC NON-CONDUCTIVE SUCTION TUBING: Brand: CARDINAL HEALTH

## (undated) DEVICE — MASK PROC MASK SOFT WHITE

## (undated) DEVICE — SYRINGE 10ML SLIP TIP

## (undated) DEVICE — SYRINGE MNJCT 10ML LF STRL REG

## (undated) DEVICE — ADAPTER BRONCHOSCOPE SWIVEL

## (undated) DEVICE — CONMED SCOPE SAVER BITE BLOCK, 20X27 MM: Brand: SCOPE SAVER

## (undated) DEVICE — 3 ML SYRINGE LUER-LOCK TIP: Brand: MONOJECT

## (undated) DEVICE — AIRLIFE™ MISTY FAST™ SMALL VOLUME NEBULIZER WITH 7 FOOT (2.1 M) CRUSH RESISTANT OXYGEN TUBING, BAFFLED MOUTHPIECE, AND 6 INCH (15 CM) FLEXTUBE: Brand: AIRLIFE™

## (undated) DEVICE — YANKAUER SUCTION INSTRUMENT NO CONTROL VENT, BULB TIP, CLEAR: Brand: YANKAUER

## (undated) DEVICE — Device: Brand: CUSTOM PROCEDURE KIT

## (undated) DEVICE — CONTAINER CLIKSEAL PP 4OZ BLU

## (undated) DEVICE — 6 ML SYRINGE LUER-LOCK TIP: Brand: MONOJECT

## (undated) DEVICE — LINE MNTR ADLT SET O2 INTMD

## (undated) DEVICE — SINGLE USE BIOPSY VALVE MAJ-210: Brand: SINGLE USE BIOPSY VALVE (STERILE)

## (undated) DEVICE — NDL ASP 21GA 2MM STRL DISP

## (undated) NOTE — LETTER
[address] [facility], [facility] [address]     I agree to have a Peripherally Inserted Central Catheter (PICC) placed in my arm.      1. The PICC insertion procedure, care, maintenance, risks, benefits, and complications Statement of Physician: My signature below affirms that prior to the time of the PICC line insertion, I have explained to the patient and/or his/her legal representative, the risks and benefits involved in the proposed treatment and any reasonable alternat

## (undated) NOTE — LETTER
[facility], [place]  Authorization for Invasive Procedures  1.  I hereby authorize Dr. Hughes Daily , my physician and whomever may be designated as the doctor's assistant, to perform the following operation and/or procedure:  Right performed for the purposes of advancing medicine, science, and/or education, provided my identity is not revealed. If the procedure has been videotaped, the physician/surgeon will obtain the original videotape.  The hospital will not be responsible for stor My signature below affirms that prior to the time of the procedure, I have explained to the patient and/or her legal representative, the risks and benefits involved in the proposed treatment and any reasonable alternative to the proposed treatment.  I have

## (undated) NOTE — LETTER
[facility], [place]  Authorization for Invasive Procedures  1.  I hereby authorize Dr. Yang Dudley , my physician and whomever may be designated as the doctor's assistant, to perform the following operation and/or procedure:  COMPUT performed for the purposes of advancing medicine, science, and/or education, provided my identity is not revealed. If the procedure has been videotaped, the physician/surgeon will obtain the original videotape.  The hospital will not be responsible for stor My signature below affirms that prior to the time of the procedure, I have explained to the patient and/or her legal representative, the risks and benefits involved in the proposed treatment and any reasonable alternative to the proposed treatment.  I have